# Patient Record
Sex: FEMALE | Race: WHITE | NOT HISPANIC OR LATINO | Employment: OTHER | URBAN - METROPOLITAN AREA
[De-identification: names, ages, dates, MRNs, and addresses within clinical notes are randomized per-mention and may not be internally consistent; named-entity substitution may affect disease eponyms.]

---

## 2023-10-24 ENCOUNTER — TELEPHONE (OUTPATIENT)
Dept: PRIMARY CARE | Facility: CLINIC | Age: 53
End: 2023-10-24
Payer: MEDICARE

## 2023-10-24 NOTE — TELEPHONE ENCOUNTER
PT L/M STATING SHE IS WAITING ON A RETURN CALL FROM [facility] ONLY. IF PT DOES NOT ANSWER CELL PHONE PLEASE CALL HOUSE.

## 2023-11-09 ENCOUNTER — APPOINTMENT (OUTPATIENT)
Dept: PRIMARY CARE | Facility: CLINIC | Age: 53
End: 2023-11-09
Payer: MEDICARE

## 2023-11-15 ENCOUNTER — APPOINTMENT (OUTPATIENT)
Dept: PRIMARY CARE | Facility: CLINIC | Age: 53
End: 2023-11-15
Payer: MEDICARE

## 2023-11-21 ENCOUNTER — APPOINTMENT (OUTPATIENT)
Dept: PRIMARY CARE | Facility: CLINIC | Age: 53
End: 2023-11-21
Payer: MEDICARE

## 2023-11-21 PROBLEM — H93.13 BILATERAL TINNITUS: Status: ACTIVE | Noted: 2023-11-21

## 2023-11-21 PROBLEM — M54.9 DORSALGIA, UNSPECIFIED: Status: ACTIVE | Noted: 2023-11-21

## 2023-11-21 PROBLEM — G40.311: Status: ACTIVE | Noted: 2023-11-21

## 2023-11-21 PROBLEM — N95.1 MENOPAUSAL SYMPTOM: Status: ACTIVE | Noted: 2023-11-21

## 2023-11-21 PROBLEM — G47.21 DELAYED SLEEP PHASE SYNDROME: Status: ACTIVE | Noted: 2023-11-21

## 2023-11-21 PROBLEM — I51.7 CARDIOMEGALY: Status: ACTIVE | Noted: 2023-11-21

## 2023-11-21 PROBLEM — M19.90 OSTEOARTHRITIS: Status: ACTIVE | Noted: 2023-11-21

## 2023-11-21 PROBLEM — R92.8 ABNORMAL FINDINGS ON DIAGNOSTIC IMAGING OF BREAST: Status: ACTIVE | Noted: 2023-11-21

## 2023-11-21 PROBLEM — M51.369 DDD (DEGENERATIVE DISC DISEASE), LUMBAR: Status: ACTIVE | Noted: 2023-11-21

## 2023-11-21 PROBLEM — G40.919 INTRACTABLE EPILEPSY (MULTI): Status: ACTIVE | Noted: 2023-11-21

## 2023-11-21 PROBLEM — E66.01 MORBID OBESITY (MULTI): Status: ACTIVE | Noted: 2023-11-21

## 2023-11-21 PROBLEM — E16.2 HYPOGLYCEMIA: Status: ACTIVE | Noted: 2023-11-21

## 2023-11-21 PROBLEM — M51.36 DDD (DEGENERATIVE DISC DISEASE), LUMBAR: Status: ACTIVE | Noted: 2023-11-21

## 2023-11-21 PROBLEM — E55.9 VITAMIN D DEFICIENCY: Status: ACTIVE | Noted: 2023-11-21

## 2023-11-21 PROBLEM — F51.01 PRIMARY INSOMNIA: Status: ACTIVE | Noted: 2023-11-21

## 2023-11-21 PROBLEM — E78.5 HYPERLIPIDEMIA: Status: ACTIVE | Noted: 2023-11-21

## 2023-11-21 PROBLEM — H90.3 SENSORINEURAL HEARING LOSS (SNHL) OF BOTH EARS: Status: ACTIVE | Noted: 2023-11-21

## 2023-11-21 PROBLEM — Z78.9 MEDICAL HOME PATIENT: Status: ACTIVE | Noted: 2023-11-21

## 2023-11-21 PROBLEM — K14.0 TONGUE ULCER: Status: ACTIVE | Noted: 2023-11-21

## 2023-11-21 PROBLEM — L21.9 SEBORRHEIC DERMATITIS: Status: ACTIVE | Noted: 2023-11-21

## 2023-11-21 PROBLEM — F32.9 MAJOR DEPRESSION, SINGLE EPISODE: Status: ACTIVE | Noted: 2023-11-21

## 2023-11-21 PROBLEM — M62.81 GENERALIZED MUSCLE WEAKNESS: Status: ACTIVE | Noted: 2023-11-21

## 2023-11-21 PROBLEM — Z98.84 BARIATRIC SURGERY STATUS: Status: ACTIVE | Noted: 2023-11-21

## 2023-11-21 PROBLEM — F43.9 STRESS: Status: ACTIVE | Noted: 2023-11-21

## 2023-11-21 PROBLEM — J34.89 NASAL VESTIBULITIS: Status: ACTIVE | Noted: 2023-11-21

## 2023-11-21 PROBLEM — G40.909 SEIZURE DISORDER (MULTI): Status: ACTIVE | Noted: 2023-11-21

## 2023-11-21 PROBLEM — G57.10 MERALGIA PARESTHETICA: Status: ACTIVE | Noted: 2023-11-21

## 2023-11-21 PROBLEM — M17.12 ARTHRITIS OF KNEE, LEFT: Status: ACTIVE | Noted: 2023-11-21

## 2023-11-21 RX ORDER — DULOXETIN HYDROCHLORIDE 30 MG/1
30 CAPSULE, DELAYED RELEASE ORAL DAILY
COMMUNITY
Start: 2020-12-22

## 2023-11-21 RX ORDER — MIRTAZAPINE 15 MG/1
15 TABLET, FILM COATED ORAL NIGHTLY
COMMUNITY

## 2023-11-21 RX ORDER — DOXYCYCLINE 100 MG/1
100 CAPSULE ORAL DAILY
COMMUNITY
Start: 2023-09-07 | End: 2024-01-15 | Stop reason: WASHOUT

## 2023-11-21 RX ORDER — DOXYCYCLINE 100 MG/1
100 TABLET ORAL 2 TIMES DAILY
COMMUNITY
Start: 2023-04-26 | End: 2024-01-15 | Stop reason: WASHOUT

## 2023-11-21 RX ORDER — AMLODIPINE BESYLATE 5 MG/1
5 TABLET ORAL DAILY
COMMUNITY

## 2023-11-21 RX ORDER — ALBUTEROL SULFATE 0.83 MG/ML
2.5 SOLUTION RESPIRATORY (INHALATION) EVERY 6 HOURS
COMMUNITY
Start: 2023-03-29

## 2023-11-21 RX ORDER — PROPRANOLOL HYDROCHLORIDE 20 MG/1
20 TABLET ORAL 4 TIMES DAILY
COMMUNITY

## 2023-11-21 RX ORDER — VENLAFAXINE HYDROCHLORIDE 150 MG/1
150 CAPSULE, EXTENDED RELEASE ORAL DAILY
COMMUNITY

## 2023-11-21 RX ORDER — OMEPRAZOLE 20 MG/1
20 TABLET, DELAYED RELEASE ORAL 2 TIMES DAILY
COMMUNITY

## 2023-11-21 RX ORDER — MUPIROCIN 20 MG/G
OINTMENT TOPICAL 2 TIMES DAILY
COMMUNITY

## 2023-11-21 RX ORDER — FLUOCINOLONE ACETONIDE 0.11 MG/ML
4 OIL AURICULAR (OTIC) 2 TIMES DAILY
COMMUNITY
Start: 2021-09-02 | End: 2024-01-15 | Stop reason: WASHOUT

## 2023-11-21 RX ORDER — PANTOPRAZOLE SODIUM 20 MG/1
20 TABLET, DELAYED RELEASE ORAL DAILY
COMMUNITY
Start: 2023-11-10

## 2023-11-21 RX ORDER — LAMOTRIGINE 200 MG/1
200 TABLET ORAL 2 TIMES DAILY
COMMUNITY

## 2023-11-21 RX ORDER — ESZOPICLONE 2 MG/1
2 TABLET, FILM COATED ORAL NIGHTLY
COMMUNITY

## 2023-11-21 RX ORDER — MECLIZINE HYDROCHLORIDE 25 MG/1
25 TABLET ORAL 3 TIMES DAILY PRN
COMMUNITY
Start: 2019-02-21

## 2023-11-21 RX ORDER — VALACYCLOVIR HYDROCHLORIDE 1 G/1
1 TABLET, FILM COATED ORAL 3 TIMES DAILY
COMMUNITY
Start: 2023-03-29

## 2023-11-21 RX ORDER — TOPIRAMATE 100 MG/1
2 CAPSULE, EXTENDED RELEASE ORAL 2 TIMES DAILY
COMMUNITY

## 2023-11-21 RX ORDER — ALBUTEROL SULFATE 90 UG/1
2 AEROSOL, METERED RESPIRATORY (INHALATION) EVERY 4 HOURS
COMMUNITY
Start: 2023-03-23

## 2023-11-21 RX ORDER — SERTRALINE HYDROCHLORIDE 50 MG/1
50 TABLET, FILM COATED ORAL DAILY
COMMUNITY

## 2023-11-21 RX ORDER — ONDANSETRON 4 MG/1
4 TABLET, FILM COATED ORAL EVERY 8 HOURS
COMMUNITY
Start: 2022-09-13

## 2023-11-21 RX ORDER — OMEPRAZOLE 20 MG/1
20 CAPSULE, DELAYED RELEASE ORAL
COMMUNITY
Start: 2020-06-15

## 2023-11-21 RX ORDER — HYDROXYZINE HYDROCHLORIDE 50 MG/1
50 TABLET, FILM COATED ORAL 3 TIMES DAILY
COMMUNITY

## 2023-11-21 RX ORDER — MAG HYDROX/ALUMINUM HYD/SIMETH 200-200-20
SUSPENSION, ORAL (FINAL DOSE FORM) ORAL 2 TIMES DAILY
COMMUNITY
Start: 2020-06-30

## 2023-11-21 RX ORDER — DICYCLOMINE HYDROCHLORIDE 10 MG/1
10 CAPSULE ORAL 2 TIMES DAILY
COMMUNITY
Start: 2023-10-13 | End: 2024-01-15 | Stop reason: WASHOUT

## 2023-11-21 RX ORDER — METRONIDAZOLE 7.5 MG/G
1 LOTION TOPICAL 2 TIMES DAILY
COMMUNITY
Start: 2020-09-17

## 2023-11-21 RX ORDER — CYCLOBENZAPRINE HCL 10 MG
10 TABLET ORAL
COMMUNITY
End: 2024-01-15 | Stop reason: WASHOUT

## 2023-11-21 RX ORDER — FLUTICASONE PROPIONATE 50 MCG
1 SPRAY, SUSPENSION (ML) NASAL EVERY 24 HOURS
COMMUNITY
Start: 2023-03-20

## 2023-11-21 RX ORDER — ERGOCALCIFEROL 1.25 MG/1
1 CAPSULE ORAL
COMMUNITY
End: 2024-06-05 | Stop reason: SDUPTHER

## 2023-11-21 RX ORDER — SILVER SULFADIAZINE 10 G/1000G
1 CREAM TOPICAL EVERY 24 HOURS
COMMUNITY
Start: 2022-04-11

## 2023-12-07 ENCOUNTER — APPOINTMENT (OUTPATIENT)
Dept: PRIMARY CARE | Facility: CLINIC | Age: 53
End: 2023-12-07
Payer: MEDICARE

## 2023-12-29 DIAGNOSIS — L29.9 EAR ITCHING: ICD-10-CM

## 2023-12-29 NOTE — TELEPHONE ENCOUNTER
Last seen 9/2022, has used fluocinolone drops for ear itching in the past. She is experiencing itching deep in the ear canal and requested a refill until her appt 2/8/2024.

## 2023-12-29 NOTE — TELEPHONE ENCOUNTER
Also having recurrent nosebleed right nares, I discussed moisture and gentle blowing. She will call if these become more frequent.

## 2024-01-01 RX ORDER — FLUOCINOLONE ACETONIDE 0.11 MG/ML
OIL AURICULAR (OTIC)
Qty: 1 ML | Refills: 1 | Status: SHIPPED | OUTPATIENT
Start: 2024-01-01

## 2024-01-15 ENCOUNTER — OFFICE VISIT (OUTPATIENT)
Dept: PRIMARY CARE | Facility: CLINIC | Age: 54
End: 2024-01-15
Payer: MEDICARE

## 2024-01-15 VITALS
OXYGEN SATURATION: 98 % | TEMPERATURE: 97.9 F | DIASTOLIC BLOOD PRESSURE: 80 MMHG | HEIGHT: 61 IN | WEIGHT: 276.8 LBS | BODY MASS INDEX: 52.26 KG/M2 | SYSTOLIC BLOOD PRESSURE: 118 MMHG | RESPIRATION RATE: 18 BRPM | HEART RATE: 81 BPM

## 2024-01-15 DIAGNOSIS — Z13.220 SCREENING FOR LIPID DISORDERS: ICD-10-CM

## 2024-01-15 DIAGNOSIS — E66.9 OBESITY WITH SERIOUS COMORBIDITY, UNSPECIFIED CLASSIFICATION, UNSPECIFIED OBESITY TYPE: ICD-10-CM

## 2024-01-15 DIAGNOSIS — R73.9 ELEVATED BLOOD SUGAR LEVEL: ICD-10-CM

## 2024-01-15 DIAGNOSIS — E55.9 VITAMIN D DEFICIENCY: ICD-10-CM

## 2024-01-15 DIAGNOSIS — Z23 NEED FOR VACCINATION: ICD-10-CM

## 2024-01-15 DIAGNOSIS — Z23 ENCOUNTER FOR IMMUNIZATION: Primary | ICD-10-CM

## 2024-01-15 DIAGNOSIS — R53.83 OTHER FATIGUE: ICD-10-CM

## 2024-01-15 LAB
25(OH)D3 SERPL-MCNC: 49 NG/ML (ref 31–100)
ALBUMIN SERPL-MCNC: 4.1 G/DL (ref 3.5–5)
ALP BLD-CCNC: 166 U/L (ref 35–125)
ALT SERPL-CCNC: 11 U/L (ref 5–40)
ANION GAP SERPL CALC-SCNC: 12 MMOL/L
AST SERPL-CCNC: 11 U/L (ref 5–40)
BILIRUB SERPL-MCNC: 0.2 MG/DL (ref 0.1–1.2)
BUN SERPL-MCNC: 22 MG/DL (ref 8–25)
CALCIUM SERPL-MCNC: 9 MG/DL (ref 8.5–10.4)
CHLORIDE SERPL-SCNC: 105 MMOL/L (ref 97–107)
CHOLEST SERPL-MCNC: 190 MG/DL (ref 133–200)
CHOLEST/HDLC SERPL: 4.2 {RATIO}
CO2 SERPL-SCNC: 22 MMOL/L (ref 24–31)
CREAT SERPL-MCNC: 0.9 MG/DL (ref 0.4–1.6)
EGFRCR SERPLBLD CKD-EPI 2021: 76 ML/MIN/1.73M*2
EST. AVERAGE GLUCOSE BLD GHB EST-MCNC: 105 MG/DL
GLUCOSE SERPL-MCNC: 102 MG/DL (ref 65–99)
HBA1C MFR BLD: 5.3 %
HDLC SERPL-MCNC: 45 MG/DL
LDLC SERPL CALC-MCNC: 113 MG/DL (ref 65–130)
POTASSIUM SERPL-SCNC: 4.2 MMOL/L (ref 3.4–5.1)
PROT SERPL-MCNC: 6.3 G/DL (ref 5.9–7.9)
SODIUM SERPL-SCNC: 139 MMOL/L (ref 133–145)
TRIGL SERPL-MCNC: 160 MG/DL (ref 40–150)
TSH SERPL DL<=0.05 MIU/L-ACNC: 2.77 MIU/L (ref 0.27–4.2)

## 2024-01-15 PROCEDURE — 99214 OFFICE O/P EST MOD 30 MIN: CPT | Performed by: NURSE PRACTITIONER

## 2024-01-15 PROCEDURE — 1036F TOBACCO NON-USER: CPT | Performed by: NURSE PRACTITIONER

## 2024-01-15 PROCEDURE — 83036 HEMOGLOBIN GLYCOSYLATED A1C: CPT | Performed by: NURSE PRACTITIONER

## 2024-01-15 PROCEDURE — 80053 COMPREHEN METABOLIC PANEL: CPT | Performed by: NURSE PRACTITIONER

## 2024-01-15 PROCEDURE — 36415 COLL VENOUS BLD VENIPUNCTURE: CPT | Performed by: NURSE PRACTITIONER

## 2024-01-15 PROCEDURE — 83718 ASSAY OF LIPOPROTEIN: CPT | Performed by: NURSE PRACTITIONER

## 2024-01-15 PROCEDURE — 82306 VITAMIN D 25 HYDROXY: CPT | Performed by: NURSE PRACTITIONER

## 2024-01-15 PROCEDURE — 90686 IIV4 VACC NO PRSV 0.5 ML IM: CPT | Performed by: NURSE PRACTITIONER

## 2024-01-15 PROCEDURE — 84443 ASSAY THYROID STIM HORMONE: CPT | Performed by: NURSE PRACTITIONER

## 2024-01-15 ASSESSMENT — PATIENT HEALTH QUESTIONNAIRE - PHQ9
2. FEELING DOWN, DEPRESSED OR HOPELESS: NOT AT ALL
SUM OF ALL RESPONSES TO PHQ9 QUESTIONS 1 AND 2: 0
1. LITTLE INTEREST OR PLEASURE IN DOING THINGS: NOT AT ALL

## 2024-01-15 ASSESSMENT — ENCOUNTER SYMPTOMS: UNEXPECTED WEIGHT CHANGE: 1

## 2024-01-15 ASSESSMENT — PAIN SCALES - GENERAL: PAINLEVEL: 0-NO PAIN

## 2024-01-15 NOTE — PROGRESS NOTES
"Subjective   Patient ID: Odilia Oneal is a 53 y.o. female who presents for Immunizations (PT IS HERE FOR A FLU SHOT) and Follow-up (PT STATES SHE NEEDS REPEAT LABS, HAD PHYSICAL 05/2023 AND STATES SOME LABS WERE NOT DONE. ).    Isha her dog has recurrent cancer.[place] [facility] [place] from [facility].has incresed appipite , eating at night .wants to eat all the time       Review of Systems   Constitutional:  Positive for unexpected weight change.   HENT:  Positive for congestion.        Objective   /80   Pulse 81   Temp 36.6 °C (97.9 °F)   Resp 18   Ht 1.549 m (5' 1\")   Wt 126 kg (276 lb 12.8 oz)   SpO2 98%   BMI 52.30 kg/m²     Physical Exam  Constitutional:       General: She is not in acute distress.     Appearance: Normal appearance.   Cardiovascular:      Rate and Rhythm: Normal rate and regular rhythm.      Heart sounds: No murmur heard.  Pulmonary:      Breath sounds: Normal breath sounds. No wheezing.   Neurological:      Mental Status: She is alert.         Assessment/Plan   Problem List Items Addressed This Visit             ICD-10-CM    Vitamin D deficiency E55.9    Relevant Orders    Vitamin D 25-Hydroxy,Total (for eval of Vitamin D levels) (Completed)     Other Visit Diagnoses         Codes    Encounter for immunization    -  Primary Z23    Other fatigue     R53.83    Relevant Orders    TSH with reflex to Free T4 if abnormal (Completed)    Comprehensive Metabolic Panel (Completed)    Elevated blood sugar level     R73.9    Relevant Orders    Hemoglobin A1c (Completed)    Comprehensive Metabolic Panel (Completed)    Obesity with serious comorbidity, unspecified classification, unspecified obesity type     E66.9    Relevant Orders    Comprehensive Metabolic Panel (Completed)    Lipid Panel (Completed)    Screening for lipid disorders     Z13.220    Need for vaccination     Z23    Relevant Orders    Flu vaccine (IIV4) age 6 months and greater, " preservative free (Completed)

## 2024-01-15 NOTE — PATIENT INSTRUCTIONS
Dicussed needs current med's from [facility]. Pt unsure of what  she is taking. Sees issa has a lot of headache,willdiscuss them with him

## 2024-01-16 DIAGNOSIS — R74.8 ELEVATED ALKALINE PHOSPHATASE LEVEL: ICD-10-CM

## 2024-01-31 ENCOUNTER — CLINICAL SUPPORT (OUTPATIENT)
Dept: PRIMARY CARE | Facility: CLINIC | Age: 54
End: 2024-01-31
Payer: MEDICARE

## 2024-01-31 DIAGNOSIS — R74.8 ELEVATED ALKALINE PHOSPHATASE LEVEL: ICD-10-CM

## 2024-01-31 LAB — PTH-INTACT SERPL-MCNC: 54.7 PG/ML (ref 18.5–88)

## 2024-01-31 PROCEDURE — 36415 COLL VENOUS BLD VENIPUNCTURE: CPT

## 2024-01-31 PROCEDURE — 83970 ASSAY OF PARATHORMONE: CPT | Mod: WESLAB

## 2024-01-31 PROCEDURE — 82330 ASSAY OF CALCIUM: CPT | Mod: WESLAB

## 2024-02-01 LAB — CA-I BLD-SCNC: 1.16 MMOL/L (ref 1.1–1.33)

## 2024-02-08 ENCOUNTER — APPOINTMENT (OUTPATIENT)
Dept: OTOLARYNGOLOGY | Facility: CLINIC | Age: 54
End: 2024-02-08
Payer: MEDICARE

## 2024-02-20 ENCOUNTER — APPOINTMENT (OUTPATIENT)
Dept: ORTHOPEDIC SURGERY | Facility: CLINIC | Age: 54
End: 2024-02-20
Payer: MEDICARE

## 2024-02-20 ENCOUNTER — TELEPHONE (OUTPATIENT)
Dept: PRIMARY CARE | Facility: CLINIC | Age: 54
End: 2024-02-20

## 2024-02-20 DIAGNOSIS — J01.90 ACUTE NON-RECURRENT SINUSITIS, UNSPECIFIED LOCATION: Primary | ICD-10-CM

## 2024-02-20 NOTE — TELEPHONE ENCOUNTER
PT C/O COUGH AND SINUS PAIN X 4 DAYS. PT STATES SHE FEELS MISERABLE AND WOULD LIKE SOMETHING TO HELP WITH THIS

## 2024-02-22 RX ORDER — FLUTICASONE PROPIONATE 50 MCG
1 SPRAY, SUSPENSION (ML) NASAL DAILY
Qty: 16 G | Refills: 5 | Status: SHIPPED | OUTPATIENT
Start: 2024-02-22 | End: 2025-02-21

## 2024-02-22 RX ORDER — AZITHROMYCIN 250 MG/1
TABLET, FILM COATED ORAL
Qty: 6 TABLET | Refills: 0 | Status: SHIPPED | OUTPATIENT
Start: 2024-02-22 | End: 2024-02-27

## 2024-02-23 ENCOUNTER — TELEPHONE (OUTPATIENT)
Dept: NEUROLOGY | Facility: CLINIC | Age: 54
End: 2024-02-23
Payer: MEDICARE

## 2024-02-23 DIAGNOSIS — G40.909 SEIZURE DISORDER (MULTI): Primary | ICD-10-CM

## 2024-03-11 ENCOUNTER — TELEPHONE (OUTPATIENT)
Dept: PRIMARY CARE | Facility: CLINIC | Age: 54
End: 2024-03-11
Payer: MEDICARE

## 2024-03-13 ENCOUNTER — TELEPHONE (OUTPATIENT)
Dept: PRIMARY CARE | Facility: CLINIC | Age: 54
End: 2024-03-13
Payer: MEDICARE

## 2024-03-13 NOTE — TELEPHONE ENCOUNTER
Pt tested positive for covid 3/8. Pt states she has irritated/puffy/painful right eye. Please advise. .

## 2024-03-26 ENCOUNTER — OFFICE VISIT (OUTPATIENT)
Dept: OTOLARYNGOLOGY | Facility: CLINIC | Age: 54
End: 2024-03-26
Payer: MEDICARE

## 2024-03-26 VITALS — BODY MASS INDEX: 51.35 KG/M2 | HEIGHT: 61 IN | WEIGHT: 272 LBS

## 2024-03-26 DIAGNOSIS — H90.3 BILATERAL SENSORINEURAL HEARING LOSS: Primary | ICD-10-CM

## 2024-03-26 DIAGNOSIS — J31.0 CHRONIC RHINITIS: ICD-10-CM

## 2024-03-26 PROCEDURE — 1036F TOBACCO NON-USER: CPT | Performed by: OTOLARYNGOLOGY

## 2024-03-26 PROCEDURE — 99214 OFFICE O/P EST MOD 30 MIN: CPT | Performed by: OTOLARYNGOLOGY

## 2024-03-26 NOTE — PROGRESS NOTES
Chief Complaint   Patient presents with    Ear Tube Check     EP 9/29/22- POSSIBLE DOG HAIR IN EAR, THROAT ISSUES     HPI:  Odilia Oneal is a 53 y.o. female who has a history of bilateral sensorineural hearing loss and wears hearing aids complains of 4-month history of some increased hearing loss and ear pressure.  Hearing aids not working as well.  Also getting some sinus headaches and pressure.  Occasional sore throat.    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    Personal history of other diseases of the nervous system and sense organs     History of obstructive sleep apnea    Unspecified convulsions (CMS/HCC)     Convulsions     Past Surgical History:   Procedure Laterality Date    MR HEAD ANGIO WO IV CONTRAST  6/23/2020    MR HEAD ANGIO WO IV CONTRAST 6/23/2020 [facility]    MR NECK ANGIO WO IV CONTRAST  6/23/2020    MR NECK ANGIO WO IV CONTRAST 6/23/2020 [facility]    OTHER SURGICAL HISTORY  02/18/2021    Tonsillectomy    OTHER SURGICAL HISTORY  02/18/2021    Shoulder surgery    OTHER SURGICAL HISTORY  02/18/2021    Back surgery    OTHER SURGICAL HISTORY  09/29/2022    Knee surgery         Medications:     Current Outpatient Medications:     albuterol 2.5 mg /3 mL (0.083 %) nebulizer solution, Take 3 mL (2.5 mg) by nebulization every 6 hours., Disp: , Rfl:     albuterol 90 mcg/actuation inhaler, Inhale 2 puffs every 4 hours., Disp: , Rfl:     amLODIPine (Norvasc) 5 mg tablet, Take 1 tablet (5 mg) by mouth once daily., Disp: , Rfl:     DULoxetine (Cymbalta) 30 mg DR capsule, Take 1 capsule (30 mg) by mouth once daily., Disp: , Rfl:     ergocalciferol (Vitamin D-2) 1.25 MG (06653 UT) capsule, Take 1 capsule (1,250 mcg) by mouth 1 (one) time per week., Disp: , Rfl:     eszopiclone (Lunesta) 2 mg tablet, Take 1 tablet (2 mg) by mouth once daily at bedtime., Disp: , Rfl:     fluocinolone  (DermOtic) 0.01 % ear drops, 4 drops to the affected ear/s twice daily as needed for itching. 1 bottle. 11 refills., Disp: 1 mL, Rfl: 1    fluticasone (Flonase) 50 mcg/actuation nasal spray, Administer 1 spray into each nostril once every 24 hours., Disp: , Rfl:     fluticasone (Flonase) 50 mcg/actuation nasal spray, Administer 1 spray into each nostril once daily. Shake gently. Before first use, prime pump. After use, clean tip and replace cap., Disp: 16 g, Rfl: 5    hydrOXYzine HCL (Atarax) 50 mg tablet, Take 1 tablet (50 mg) by mouth 3 times a day., Disp: , Rfl:     lamoTRIgine (LaMICtal) 200 mg tablet, Take 1 tablet (200 mg) by mouth 2 times a day., Disp: , Rfl:     metroNIDAZOLE (Metrolotion) 0.75 % lotion lotion, Apply 1 Application topically 2 times a day., Disp: , Rfl:     mirtazapine (Remeron) 15 mg tablet, Take 1 tablet (15 mg) by mouth once daily at bedtime., Disp: , Rfl:     mupirocin (Bactroban) 2 % ointment, Apply topically 2 times a day., Disp: , Rfl:     omeprazole (PriLOSEC) 20 mg DR capsule, Take 1 capsule (20 mg) by mouth once daily in the morning. Take before meals., Disp: , Rfl:     omeprazole OTC (PriLOSEC OTC) 20 mg EC tablet, Take 1 tablet (20 mg) by mouth twice a day., Disp: , Rfl:     ondansetron (Zofran) 4 mg tablet, Take 1 tablet (4 mg) by mouth every 8 hours., Disp: , Rfl:     pantoprazole (ProtoNix) 20 mg EC tablet, Take 1 tablet (20 mg) by mouth once daily., Disp: , Rfl:     propranolol (Inderal) 20 mg tablet, Take 1 tablet (20 mg) by mouth 4 times a day., Disp: , Rfl:     sertraline (Zoloft) 50 mg tablet, Take 1 tablet (50 mg) by mouth once daily., Disp: , Rfl:     silver sulfADIAZINE (Silvadene) 1 % cream, Apply 1 Application topically once every 24 hours., Disp: , Rfl:     simvastatin (Zocor) 20 mg tablet, TAKE 1 TABLET BY MOUTH ONCE DAILY IN THE EVENING, Disp: 90 tablet, Rfl: 1    Trokendi  mg capsule,extended release 24hr, Take 2 capsules by mouth 2 times a day., Disp: ,  "Rfl:     valACYclovir (Valtrex) 1 gram tablet, Take 1 tablet (1,000 mg) by mouth 3 times a day., Disp: , Rfl:     venlafaxine XR (Effexor-XR) 150 mg 24 hr capsule, Take 1 capsule (150 mg) by mouth once daily., Disp: , Rfl:     hydrocortisone 1 % ointment, Apply topically twice a day., Disp: , Rfl:     meclizine (Antivert) 25 mg tablet, Take 1 tablet (25 mg) by mouth 3 times a day as needed., Disp: , Rfl:      Allergies:  Allergies   Allergen Reactions    Aspirin Nausea/vomiting    Chlorhexidine Gluconate Other    Codeine Other and Nausea/vomiting    Doxycycline Unknown    Ibuprofen Other and Nausea/vomiting    Penicillins Nausea/vomiting    Adhesive Tape-Silicones Rash and Other    Latex Other and Rash        ROS:  Review of systems normal unless stated otherwise in the HPI and/or PMH.    Physical Exam:  Height 1.549 m (5' 1\"), weight 123 kg (272 lb). Body mass index is 51.39 kg/m².     GENERAL APPEARANCE: Well developed and well nourished.  Alert and oriented in no acute distress.  Normal vocal quality.      HEAD/FACE: No erythema or edema or facial tenderness.  Normal facial nerve function bilaterally.    EAR:       EXTERNAL: Normal pinnas and external auditory canals without lesion or obstructing wax.  Mild wax removed.       MIDDLE EAR: Tympanic membranes intact and mobile with normal landmarks.  Middle ear space appears well aerated.       TUBE STATUS: N/A       MASTOID CAVITY: N/A       HEARING: Gross hearing assessment is within normal limits.      NOSE:       VISUALIZED USING: Anterior rhinoscopy with headlight and nasal speculum.       DORSUM: Midline, nontraumatic appearance.       MUCOSA: Normal-appearing.       SECRETIONS: Normal.       SEPTUM: Midline and nonobstructing.       INFERIOR TURBINATES: Normal.       MIDDLE TURBINATES/MEATUS: N/A       BLEEDING: N/A         ORAL CAVITY/PHARYNX:       TEETH: Adequate dentition.       TONGUE: No mass or lesion.  Normal mobility.       FLOOR OF MOUTH: No mass or " lesion.       PALATE: Normal hard palate, soft palate, and uvula.       OROPHARYNX: Normal without mass or lesion.  Tonsils absent       BUCCAL MUCOSA/GBS: Normal without mass or lesion.       LIPS: Normal.    LARYNX/HYPOPHARYNX/NASOPHARYNX: N/A    NECK: No palpable masses or abnormal adenopathy.  Trachea is midline.    THYROID: No thyromegaly or palpable nodule.    SALIVARY GLANDS: Normal bilateral parotid and submandibular glands by inspection and palpation.    TMJ's: Normal.    NEURO: Cranial nerve exam grossly normal bilaterally.       Assessment/Plan   Odilia was seen today for ear tube check.  Diagnoses and all orders for this visit:  Bilateral sensorineural hearing loss (Primary)  Chronic rhinitis     Reassured.  Head neck exam looks okay.  Recommend she have a repeat hearing test and discussed with her audiologist potentially adjusting her hearing aids.  Use Flonase and saline spray for some headaches with weather changes.  Follow-up with PCP as well.  Follow up for audiogram.     Kobi Monge MD

## 2024-04-08 DIAGNOSIS — M17.12 ARTHRITIS OF KNEE, LEFT: Primary | ICD-10-CM

## 2024-04-08 DIAGNOSIS — M51.36 DDD (DEGENERATIVE DISC DISEASE), LUMBAR: ICD-10-CM

## 2024-04-10 NOTE — TELEPHONE ENCOUNTER
Pt states she has a hard time getting around with arthritis in back and can't walk very far.   Pt c/o ongoing cough post covid. Worse at night per pt. Any recommendations?

## 2024-04-22 RX ORDER — CALCIUM CARBONATE 160(400)MG
TABLET,CHEWABLE ORAL
Qty: 1 EACH | Refills: 0 | Status: SHIPPED | OUTPATIENT
Start: 2024-04-22

## 2024-05-06 ENCOUNTER — TELEPHONE (OUTPATIENT)
Dept: PRIMARY CARE | Facility: CLINIC | Age: 54
End: 2024-05-06
Payer: MEDICARE

## 2024-05-06 DIAGNOSIS — M17.12 ARTHRITIS OF KNEE, LEFT: ICD-10-CM

## 2024-05-06 DIAGNOSIS — M51.36 DDD (DEGENERATIVE DISC DISEASE), LUMBAR: ICD-10-CM

## 2024-05-13 NOTE — TELEPHONE ENCOUNTER
Can you assist with this or recommend a company to submit paperwork to? I am not sure how to order a motorized scooter.

## 2024-06-05 DIAGNOSIS — E55.9 VITAMIN D DEFICIENCY: ICD-10-CM

## 2024-06-05 RX ORDER — ERGOCALCIFEROL 1.25 MG/1
1 CAPSULE ORAL
Qty: 12 CAPSULE | Refills: 0 | Status: SHIPPED | OUTPATIENT
Start: 2024-06-09

## 2024-06-16 DIAGNOSIS — E78.5 HYPERLIPIDEMIA, UNSPECIFIED HYPERLIPIDEMIA TYPE: Primary | ICD-10-CM

## 2024-06-17 RX ORDER — SIMVASTATIN 20 MG/1
20 TABLET, FILM COATED ORAL EVERY EVENING
Qty: 90 TABLET | Refills: 0 | Status: SHIPPED | OUTPATIENT
Start: 2024-06-17

## 2024-06-24 ENCOUNTER — TELEPHONE (OUTPATIENT)
Dept: NEUROLOGY | Facility: CLINIC | Age: 54
End: 2024-06-24
Payer: MEDICARE

## 2024-07-09 ENCOUNTER — OFFICE VISIT (OUTPATIENT)
Dept: PRIMARY CARE | Facility: CLINIC | Age: 54
End: 2024-07-09
Payer: MEDICARE

## 2024-07-09 VITALS — BODY MASS INDEX: 52.3 KG/M2 | HEIGHT: 61 IN | RESPIRATION RATE: 18 BRPM | WEIGHT: 277 LBS
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
## 2024-07-09 DIAGNOSIS — N62 LARGE BREASTS: ICD-10-CM

## 2024-07-09 DIAGNOSIS — L42 PITYRIASIS ROSEA: ICD-10-CM

## 2024-07-09 PROCEDURE — 1036F TOBACCO NON-USER: CPT | Performed by: NURSE PRACTITIONER

## 2024-07-09 PROCEDURE — 99213 OFFICE O/P EST LOW 20 MIN: CPT | Performed by: NURSE PRACTITIONER

## 2024-07-09 RX ORDER — TRIAMCINOLONE ACETONIDE 1 MG/G
CREAM TOPICAL
COMMUNITY
Start: 2024-04-16

## 2024-07-09 RX ORDER — TACROLIMUS 1 MG/G
1 OINTMENT TOPICAL
COMMUNITY
Start: 2024-05-05

## 2024-07-09 RX ORDER — METHYLPREDNISOLONE 4 MG/1
TABLET ORAL
Qty: 21 TABLET | Refills: 0 | Status: SHIPPED | OUTPATIENT
Start: 2024-07-09 | End: 2024-07-16

## 2024-07-09 RX ORDER — DICYCLOMINE HYDROCHLORIDE 10 MG/1
10 CAPSULE ORAL 4 TIMES DAILY
COMMUNITY
Start: 2024-04-24

## 2024-07-09 RX ORDER — TRIAMCINOLONE ACETONIDE 1 MG/G
CREAM TOPICAL 2 TIMES DAILY
Qty: 30 G | Refills: 0 | Status: SHIPPED | OUTPATIENT
Start: 2024-07-09

## 2024-07-09 RX ORDER — CETIRIZINE HYDROCHLORIDE 10 MG/1
10 TABLET ORAL DAILY
Qty: 30 TABLET | Refills: 2 | Status: SHIPPED | OUTPATIENT
Start: 2024-07-09 | End: 2024-10-07

## 2024-07-09 ASSESSMENT — LIFESTYLE VARIABLES
SKIP TO QUESTIONS 9-10: 1
HOW OFTEN DO YOU HAVE A DRINK CONTAINING ALCOHOL: NEVER
HOW MANY STANDARD DRINKS CONTAINING ALCOHOL DO YOU HAVE ON A TYPICAL DAY: PATIENT DOES NOT DRINK
HOW OFTEN DO YOU HAVE SIX OR MORE DRINKS ON ONE OCCASION: NEVER
AUDIT-C TOTAL SCORE: 0

## 2024-07-09 ASSESSMENT — PATIENT HEALTH QUESTIONNAIRE - PHQ9
1. LITTLE INTEREST OR PLEASURE IN DOING THINGS: NOT AT ALL
2. FEELING DOWN, DEPRESSED OR HOPELESS: NOT AT ALL
SUM OF ALL RESPONSES TO PHQ9 QUESTIONS 1 AND 2: 0

## 2024-07-09 ASSESSMENT — PAIN SCALES - GENERAL: PAINLEVEL: 0-NO PAIN

## 2024-07-09 NOTE — PATIENT INSTRUCTIONS
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

## 2024-07-09 NOTE — PROGRESS NOTES
"Subjective   Patient ID: Odilia Oneal is a 53 y.o. female who presents for Rash (PT C/O RASH ON LEFT BREAST AND BOTH FLANKS. PT ALSO WITH BUG BITES ON HER LEGS THAT ARE VERY ITCHY).    RASH STARTEDONTRUNK FOR 2DAYS HAS NILAY SPREADINH    Rash  This is a new problem. The current episode started in the past 7 days. The problem has been waxing and waning since onset. The affected locations include the abdomen, left lower leg, torso and right lower leg. The rash is characterized by redness and itchiness. Past treatments include analgesics and moisturizer. The treatment provided no relief.        Review of Systems   Skin:  Positive for rash.       Objective   Resp 18   Ht 1.549 m (5' 1\")   Wt 126 kg (277 lb)   BMI 52.34 kg/m²     Physical Exam  Constitutional:       Appearance: Normal appearance. She is obese.   Genitourinary:     Comments: Pendulous breasts that cause back pain  Skin:     Findings: Rash present.      Comments: PT HAS  2 DIFFERENT RASHES RASH ON ABDOMEN HAS A HAROLDspot and  HAS SALMON OVAL scaley lesion pinkish in color .THEYTHEMTHEIRS  LESIONS ON FRONT TRUNK NO INFECTION  MORE IN JEY FORMATION. RASHONLEGS BITES THAT AREI NFLAMMED RED BLOTCHES ALL OVER OBVIOUS ITCHING   Neurological:      Mental Status: She is alert.   Psychiatric:         Behavior: Behavior normal.         Assessment/Plan   Problem List Items Addressed This Visit    None  Visit Diagnoses         Codes    Bug bite, initial encounter    -  Primary W57.XXXA               "

## 2024-07-10 ENCOUNTER — TELEPHONE (OUTPATIENT)
Dept: NEUROLOGY | Facility: CLINIC | Age: 54
End: 2024-07-10
Payer: MEDICARE

## 2024-07-10 DIAGNOSIS — G40.909 SEIZURE DISORDER (MULTI): Primary | ICD-10-CM

## 2024-07-22 ENCOUNTER — TELEPHONE (OUTPATIENT)
Dept: PRIMARY CARE | Facility: CLINIC | Age: 54
End: 2024-07-22
Payer: MEDICARE

## 2024-07-22 DIAGNOSIS — N62 LARGE BREASTS: Primary | ICD-10-CM

## 2024-08-05 DIAGNOSIS — E78.5 HYPERLIPIDEMIA, UNSPECIFIED HYPERLIPIDEMIA TYPE: ICD-10-CM

## 2024-08-05 RX ORDER — SIMVASTATIN 20 MG/1
20 TABLET, FILM COATED ORAL EVERY EVENING
Qty: 90 TABLET | Refills: 0 | Status: SHIPPED | OUTPATIENT
Start: 2024-08-05

## 2024-09-02 DIAGNOSIS — E55.9 VITAMIN D DEFICIENCY: ICD-10-CM

## 2024-09-03 RX ORDER — ERGOCALCIFEROL 1.25 MG/1
1 CAPSULE ORAL
Qty: 12 CAPSULE | Refills: 0 | Status: SHIPPED | OUTPATIENT
Start: 2024-09-08

## 2024-09-05 ENCOUNTER — TELEPHONE (OUTPATIENT)
Dept: PRIMARY CARE | Facility: CLINIC | Age: 54
End: 2024-09-05
Payer: MEDICARE

## 2024-09-05 DIAGNOSIS — E66.9 OBESITY WITH SERIOUS COMORBIDITY, UNSPECIFIED CLASSIFICATION, UNSPECIFIED OBESITY TYPE: Primary | ICD-10-CM

## 2024-09-05 NOTE — TELEPHONE ENCOUNTER
Pt called and states dr Lim would like to know if she qualifies for any of the weight loss shots.

## 2024-09-06 ENCOUNTER — OFFICE VISIT (OUTPATIENT)
Dept: NEUROLOGY | Facility: CLINIC | Age: 54
End: 2024-09-06
Payer: MEDICARE

## 2024-09-06 DIAGNOSIS — G40.909 SEIZURE DISORDER (MULTI): Primary | ICD-10-CM

## 2024-09-06 PROCEDURE — 99213 OFFICE O/P EST LOW 20 MIN: CPT | Performed by: PSYCHIATRY & NEUROLOGY

## 2024-09-06 RX ORDER — LAMOTRIGINE 200 MG/1
200 TABLET ORAL 2 TIMES DAILY
Qty: 180 TABLET | Refills: 3 | Status: SHIPPED | OUTPATIENT
Start: 2024-09-06

## 2024-09-06 RX ORDER — TOPIRAMATE 100 MG/1
2 CAPSULE, EXTENDED RELEASE ORAL 2 TIMES DAILY
Qty: 360 CAPSULE | Refills: 3 | Status: SHIPPED | OUTPATIENT
Start: 2024-09-06

## 2024-09-06 NOTE — PATIENT INSTRUCTIONS
Odilia I am retiring.  Please establish care with another  neurologist (call [phone] to schedule an appointment).  Local neurologists I recommend are Dr. Jay Branham and Dr. Michelet Chopra.  Their offices are in [place] or [place] and you can schedule an appointment with them right now at your convenience.  Also, I can recommend Dr. Patrick Villanueva and Dr. Leo Chase, who will be opening an office in [place] in the coming months - scheduling for them will be available soon by calling the same  scheduling number [phone].   And there are  neurologists in other locations too.  All of your medical records will be in the computer and available to any  physician you choose.  Thanks and best wishes to you  --Dr. Bashir

## 2024-09-06 NOTE — PROGRESS NOTES
Chief complaint:    53 year old woman with seizure disorder, also pseudoseizures.  PCP Amrita Booker CNP.    HPI:    No seizures.  No side effects.  Takes meds regularly.  No new neurological symptoms.    Feels good.  Been my patient for about 25 years or so.    Current medications include:  Lamotrigine 200 mg BID  Trokendi  mg takes 2-2    I reviewed the relevant portions of the patient's chart since the last visit with me on  9/8/23.    Neurologic Exam     Mental Status   Oriented to person, place, and time.   Level of consciousness: alert    Cranial Nerves   Cranial nerves II through XII intact.     Motor Exam   Muscle bulk: normal  Overall muscle tone: normal    Strength   Strength 5/5 except as noted.     Sensory Exam   Light touch normal.     Gait, Coordination, and Reflexes     Gait  Gait: normal    Coordination   Romberg: negative    Reflexes   Reflexes 2+ except as noted.   Right plantar: normal  Left plantar: normal     I reviewed the following data on the patient:  Lab work reviewed.    Assessment and Plan:  Seizure-free.  Doing well.  Continue current treatment.   The patient was advised I will be retiring in September of 2024.   Suggested patient establish care with another  neurologist.  There are providers that see patients in [facility], and other area locations.  Contact information to schedule was provided to the patient.    [id number]/TC>50%      Haris Bashir MD

## 2024-09-16 ENCOUNTER — OFFICE VISIT (OUTPATIENT)
Dept: PRIMARY CARE | Facility: CLINIC | Age: 54
End: 2024-09-16
Payer: MEDICARE

## 2024-09-16 ENCOUNTER — TELEPHONE (OUTPATIENT)
Dept: NEUROLOGY | Facility: CLINIC | Age: 54
End: 2024-09-16

## 2024-09-16 VITALS
TEMPERATURE: 97.9 F | HEIGHT: 61 IN | BODY MASS INDEX: 53.43 KG/M2 | HEART RATE: 74 BPM | RESPIRATION RATE: 18 BRPM | WEIGHT: 283 LBS | OXYGEN SATURATION: 99 %

## 2024-09-16 DIAGNOSIS — Z12.31 ENCOUNTER FOR SCREENING MAMMOGRAM FOR MALIGNANT NEOPLASM OF BREAST: ICD-10-CM

## 2024-09-16 DIAGNOSIS — R56.9 SEIZURE (MULTI): ICD-10-CM

## 2024-09-16 DIAGNOSIS — E78.2 MIXED HYPERLIPIDEMIA: ICD-10-CM

## 2024-09-16 DIAGNOSIS — G47.9 SLEEP DIFFICULTIES: ICD-10-CM

## 2024-09-16 DIAGNOSIS — E66.01 MORBID OBESITY (MULTI): ICD-10-CM

## 2024-09-16 DIAGNOSIS — Z11.51 SCREENING FOR HPV (HUMAN PAPILLOMAVIRUS): ICD-10-CM

## 2024-09-16 DIAGNOSIS — I10 HYPERTENSION, UNSPECIFIED TYPE: ICD-10-CM

## 2024-09-16 DIAGNOSIS — Z11.59 ENCOUNTER FOR HEPATITIS C SCREENING TEST FOR LOW RISK PATIENT: ICD-10-CM

## 2024-09-16 DIAGNOSIS — Z12.72 VAGINAL SMEAR: ICD-10-CM

## 2024-09-16 DIAGNOSIS — Z23 ENCOUNTER FOR IMMUNIZATION: ICD-10-CM

## 2024-09-16 DIAGNOSIS — Z00.00 WELL ADULT EXAM: Primary | ICD-10-CM

## 2024-09-16 DIAGNOSIS — Z01.419 WELL WOMAN EXAM: ICD-10-CM

## 2024-09-16 LAB — HCV AB SER QL: NONREACTIVE

## 2024-09-16 PROCEDURE — 36415 COLL VENOUS BLD VENIPUNCTURE: CPT | Performed by: NURSE PRACTITIONER

## 2024-09-16 PROCEDURE — G0008 ADMIN INFLUENZA VIRUS VAC: HCPCS | Performed by: NURSE PRACTITIONER

## 2024-09-16 PROCEDURE — 86803 HEPATITIS C AB TEST: CPT | Mod: WESLAB | Performed by: NURSE PRACTITIONER

## 2024-09-16 PROCEDURE — 99396 PREV VISIT EST AGE 40-64: CPT | Performed by: NURSE PRACTITIONER

## 2024-09-16 RX ORDER — FLUTICASONE FUROATE AND VILANTEROL TRIFENATATE 100; 25 UG/1; UG/1
POWDER RESPIRATORY (INHALATION)
COMMUNITY
Start: 2024-08-29

## 2024-09-16 RX ORDER — TRAZODONE HYDROCHLORIDE 50 MG/1
TABLET ORAL
COMMUNITY
Start: 2024-08-29

## 2024-09-16 ASSESSMENT — LIFESTYLE VARIABLES
AUDIT-C TOTAL SCORE: 0
HOW OFTEN DO YOU HAVE SIX OR MORE DRINKS ON ONE OCCASION: NEVER
HOW MANY STANDARD DRINKS CONTAINING ALCOHOL DO YOU HAVE ON A TYPICAL DAY: PATIENT DOES NOT DRINK
SKIP TO QUESTIONS 9-10: 1
HOW OFTEN DO YOU HAVE A DRINK CONTAINING ALCOHOL: NEVER

## 2024-09-16 ASSESSMENT — ACTIVITIES OF DAILY LIVING (ADL)
GROCERY_SHOPPING: INDEPENDENT
MANAGING_FINANCES: INDEPENDENT
BATHING: INDEPENDENT
DRESSING: INDEPENDENT
TAKING_MEDICATION: INDEPENDENT
DOING_HOUSEWORK: NEEDS ASSISTANCE

## 2024-09-16 ASSESSMENT — ENCOUNTER SYMPTOMS
BACK PAIN: 1
ARTHRALGIAS: 1

## 2024-09-16 ASSESSMENT — PATIENT HEALTH QUESTIONNAIRE - PHQ9
SUM OF ALL RESPONSES TO PHQ9 QUESTIONS 1 AND 2: 0
1. LITTLE INTEREST OR PLEASURE IN DOING THINGS: NOT AT ALL
2. FEELING DOWN, DEPRESSED OR HOPELESS: NOT AT ALL

## 2024-09-16 ASSESSMENT — PAIN SCALES - GENERAL: PAINLEVEL: 0-NO PAIN

## 2024-09-16 NOTE — PROGRESS NOTES
"Subjective   Patient ID: Odilia Oneal is a 53 y.o. female who presents for Medicare Annual Wellness Visit Subsequent (Pt here for medicare physical. Pt is fasting. Pt had seizure this morning) and note (Pt needs note stating she can't work in regards to her back pain).    Here for a well visit, sees dr parsons for her seizures, he has not given her another neurologist,saw dr nguyen, for breast reduction , needs a Rolator,        Review of Systems   Musculoskeletal:  Positive for arthralgias and back pain.       Objective   Pulse 74   Temp 36.6 °C (97.9 °F)   Resp 18   Ht 1.549 m (5' 1\")   Wt 128 kg (283 lb)   SpO2 99%   BMI 53.47 kg/m²     Physical Exam  Constitutional:       Appearance: Normal appearance. She is obese.   HENT:      Right Ear: Tympanic membrane normal.   Eyes:      Conjunctiva/sclera: Conjunctivae normal.   Cardiovascular:      Rate and Rhythm: Normal rate and regular rhythm.   Pulmonary:      Effort: Pulmonary effort is normal.   Abdominal:      General: Bowel sounds are normal.   Genitourinary:     Comments: PAP DONE NO CMT VERY LARGE BREASTS  EXAM NL  Musculoskeletal:      Cervical back: Normal range of motion.   Neurological:      Mental Status: She is alert and oriented to person, place, and time.   Psychiatric:         Behavior: Behavior normal.       Assessment/Plan   Encounter Diagnoses   Name Primary?   • Well adult exam Yes   • Seizure (Multi)    • Hypertension, unspecified type    • Mixed hyperlipidemia    • Sleep difficulties    • Encounter for screening mammogram for malignant neoplasm of breast    • Well woman exam    • Vaginal smear    • Screening for HPV (human papillomavirus)    • Encounter for immunization    • Encounter for hepatitis C screening test for low risk patient    • Morbid obesity (Multi)     DISCUSSED AND REVIEWED MEDICATIONS,  WILL DISCUSS WITH DR PARSONS WHO SHE SHOULD BE SEEING, HE IS RETIRING,PATSY CALL WITH LABS AND PREVEBATIVE SCRENING        "

## 2024-09-25 ENCOUNTER — APPOINTMENT (OUTPATIENT)
Dept: RADIOLOGY | Facility: HOSPITAL | Age: 54
End: 2024-09-25
Payer: MEDICARE

## 2024-09-26 ENCOUNTER — TELEPHONE (OUTPATIENT)
Dept: PRIMARY CARE | Facility: CLINIC | Age: 54
End: 2024-09-26

## 2024-09-26 ENCOUNTER — APPOINTMENT (OUTPATIENT)
Dept: PHARMACY | Facility: HOSPITAL | Age: 54
End: 2024-09-26
Payer: MEDICARE

## 2024-09-26 DIAGNOSIS — E66.9 OBESITY WITH SERIOUS COMORBIDITY, UNSPECIFIED CLASSIFICATION, UNSPECIFIED OBESITY TYPE: ICD-10-CM

## 2024-09-26 RX ORDER — TIRZEPATIDE 2.5 MG/.5ML
2.5 INJECTION, SOLUTION SUBCUTANEOUS
Qty: 2 ML | Refills: 0 | Status: SHIPPED | OUTPATIENT
Start: 2024-09-26

## 2024-09-26 NOTE — PROGRESS NOTES
Patient received the following medication education on Mounjaro:    - Counseled patient on MOA, expectations, side effects, duration of therapy, contraindications, administration, and monitoring parameters.  - Provided detailed dosing and administration counseling to ensure proper technique.   - Reviewed Mounjaro titration schedule, starting with 2.5 mg once weekly to 5mg starting on the 5th week. Patient verbalized understanding  - Counseled patient on the benefits of GLP-1ra glycemic control and weight loss  - Reviewed storage requirements of Mounjaro when not in use, and when to administer the medication if a dose is missed.  - Advised patient that they may experience improved satiety after meals and portion sizes of meals may be reduced as doses of Mounjaro increase.    Medication education provided by GILBERT Casillas, PharmD, BCPS

## 2024-10-02 LAB
CYTOLOGY CMNT CVX/VAG CYTO-IMP: NORMAL
HPV HR GENOTYPES PNL CVX NAA+PROBE: NEGATIVE
LAB AP HPV GENOTYPE QUESTION: NO
LAB AP HPV HR: NORMAL
LABORATORY COMMENT REPORT: NORMAL
MENSTRUAL HX REPORTED: NORMAL
PATH REPORT.TOTAL CANCER: NORMAL

## 2024-10-16 ENCOUNTER — HOSPITAL ENCOUNTER (OUTPATIENT)
Dept: RADIOLOGY | Facility: HOSPITAL | Age: 54
Discharge: HOME | End: 2024-10-16
Payer: MEDICARE

## 2024-10-16 VITALS — BODY MASS INDEX: 53.43 KG/M2 | WEIGHT: 283 LBS | HEIGHT: 61 IN

## 2024-10-16 DIAGNOSIS — Z12.31 ENCOUNTER FOR SCREENING MAMMOGRAM FOR MALIGNANT NEOPLASM OF BREAST: ICD-10-CM

## 2024-10-16 PROCEDURE — 77067 SCR MAMMO BI INCL CAD: CPT

## 2024-12-02 DIAGNOSIS — E55.9 VITAMIN D DEFICIENCY: ICD-10-CM

## 2024-12-02 RX ORDER — ERGOCALCIFEROL 1.25 MG/1
1 CAPSULE ORAL
Qty: 12 CAPSULE | Refills: 0 | Status: SHIPPED | OUTPATIENT
Start: 2024-12-02

## 2024-12-12 ENCOUNTER — TELEPHONE (OUTPATIENT)
Dept: PRIMARY CARE | Facility: CLINIC | Age: 54
End: 2024-12-12
Payer: MEDICARE

## 2024-12-12 DIAGNOSIS — G40.909 SEIZURE DISORDER (MULTI): ICD-10-CM

## 2024-12-12 NOTE — TELEPHONE ENCOUNTER
Pt called and states earring hole is all red and itches. Pt states there may be something in there. Pt does not wear earrings. Pt would like pk advice

## 2024-12-16 NOTE — TELEPHONE ENCOUNTER
Spoke with pt. Pt aware referral in for derm. Pt also states there is no rash and she will follow up with her dermatologist

## 2024-12-30 ENCOUNTER — TELEPHONE (OUTPATIENT)
Dept: PRIMARY CARE | Facility: CLINIC | Age: 54
End: 2024-12-30
Payer: MEDICARE

## 2025-01-07 ENCOUNTER — HOSPITAL ENCOUNTER (OUTPATIENT)
Dept: RADIOLOGY | Facility: CLINIC | Age: 55
Discharge: HOME | End: 2025-01-07
Payer: MEDICARE

## 2025-01-07 DIAGNOSIS — G40.909 EPILEPSY, UNSPECIFIED, NOT INTRACTABLE, WITHOUT STATUS EPILEPTICUS: ICD-10-CM

## 2025-01-07 DIAGNOSIS — G43.009 MIGRAINE WITHOUT AURA, NOT INTRACTABLE, WITHOUT STATUS MIGRAINOSUS: ICD-10-CM

## 2025-01-07 PROCEDURE — 70551 MRI BRAIN STEM W/O DYE: CPT

## 2025-01-07 PROCEDURE — 70551 MRI BRAIN STEM W/O DYE: CPT | Performed by: RADIOLOGY

## 2025-01-16 ENCOUNTER — APPOINTMENT (OUTPATIENT)
Dept: PRIMARY CARE | Facility: CLINIC | Age: 55
End: 2025-01-16
Payer: MEDICARE

## 2025-01-20 ENCOUNTER — TELEPHONE (OUTPATIENT)
Dept: PRIMARY CARE | Facility: CLINIC | Age: 55
End: 2025-01-20
Payer: MEDICARE

## 2025-01-20 DIAGNOSIS — R05.1 ACUTE COUGH: Primary | ICD-10-CM

## 2025-01-21 RX ORDER — ALBUTEROL SULFATE 0.83 MG/ML
2.5 SOLUTION RESPIRATORY (INHALATION) EVERY 6 HOURS
Qty: 75 ML | Refills: 1 | Status: SHIPPED | OUTPATIENT
Start: 2025-01-21

## 2025-01-21 RX ORDER — BENZONATATE 200 MG/1
200 CAPSULE ORAL 3 TIMES DAILY PRN
Qty: 42 CAPSULE | Refills: 3 | Status: SHIPPED | OUTPATIENT
Start: 2025-01-21 | End: 2025-07-20

## 2025-01-29 ENCOUNTER — OFFICE VISIT (OUTPATIENT)
Dept: PRIMARY CARE | Facility: CLINIC | Age: 55
End: 2025-01-29
Payer: MEDICARE

## 2025-01-29 VITALS
WEIGHT: 283.6 LBS | HEART RATE: 75 BPM | BODY MASS INDEX: 53.54 KG/M2 | HEIGHT: 61 IN | DIASTOLIC BLOOD PRESSURE: 84 MMHG | TEMPERATURE: 97.9 F | OXYGEN SATURATION: 98 % | RESPIRATION RATE: 18 BRPM | SYSTOLIC BLOOD PRESSURE: 124 MMHG

## 2025-01-29 DIAGNOSIS — R05.1 ACUTE COUGH: ICD-10-CM

## 2025-01-29 DIAGNOSIS — E66.01 MORBID OBESITY (MULTI): Primary | ICD-10-CM

## 2025-01-29 DIAGNOSIS — Z13.29 SCREENING FOR THYROID DISORDER: ICD-10-CM

## 2025-01-29 DIAGNOSIS — R63.5 WEIGHT GAIN: ICD-10-CM

## 2025-01-29 DIAGNOSIS — N61.0 CELLULITIS OF LEFT BREAST: ICD-10-CM

## 2025-01-29 DIAGNOSIS — R73.09 ELEVATED GLUCOSE: ICD-10-CM

## 2025-01-29 DIAGNOSIS — G47.30 SLEEP APNEA, UNSPECIFIED TYPE: ICD-10-CM

## 2025-01-29 LAB — POC HEMOGLOBIN A1C: 5.7 % (ref 4.2–6.5)

## 2025-01-29 PROCEDURE — 99214 OFFICE O/P EST MOD 30 MIN: CPT | Performed by: NURSE PRACTITIONER

## 2025-01-29 PROCEDURE — 83036 HEMOGLOBIN GLYCOSYLATED A1C: CPT | Mod: MUE | Performed by: NURSE PRACTITIONER

## 2025-01-29 PROCEDURE — 3008F BODY MASS INDEX DOCD: CPT | Performed by: NURSE PRACTITIONER

## 2025-01-29 PROCEDURE — 1036F TOBACCO NON-USER: CPT | Performed by: NURSE PRACTITIONER

## 2025-01-29 RX ORDER — BENZONATATE 200 MG/1
200 CAPSULE ORAL 3 TIMES DAILY PRN
Qty: 42 CAPSULE | Refills: 3 | Status: SHIPPED | OUTPATIENT
Start: 2025-01-29 | End: 2025-07-28

## 2025-01-29 RX ORDER — BENZONATATE 200 MG/1
200 CAPSULE ORAL 3 TIMES DAILY PRN
Qty: 42 CAPSULE | Refills: 3 | Status: SHIPPED | OUTPATIENT
Start: 2025-01-29 | End: 2025-01-29 | Stop reason: WASHOUT

## 2025-01-29 RX ORDER — SULFAMETHOXAZOLE AND TRIMETHOPRIM 800; 160 MG/1; MG/1
1 TABLET ORAL 2 TIMES DAILY
Qty: 28 TABLET | Refills: 0 | Status: SHIPPED | OUTPATIENT
Start: 2025-01-29 | End: 2025-02-12

## 2025-01-29 ASSESSMENT — ENCOUNTER SYMPTOMS
COUGH: 1
BREAST PAIN: 1
UNEXPECTED WEIGHT CHANGE: 1

## 2025-01-29 ASSESSMENT — PAIN SCALES - GENERAL: PAINLEVEL_OUTOF10: 8

## 2025-01-29 ASSESSMENT — PATIENT HEALTH QUESTIONNAIRE - PHQ9
2. FEELING DOWN, DEPRESSED OR HOPELESS: NOT AT ALL
1. LITTLE INTEREST OR PLEASURE IN DOING THINGS: NOT AT ALL
SUM OF ALL RESPONSES TO PHQ9 QUESTIONS 1 AND 2: 0

## 2025-01-29 NOTE — PROGRESS NOTES
"Subjective   Patient ID: Odilia Oneal is a 54 y.o. female who presents for Cough (PT C/O ONGOING COUGH X 2 WEEKS. COUGH IS WORSE AT NIGHT. ) and Breast Pain (PT C/O LEFT BREAST PAIN X 2 DAYS. PT HAS ITCHING AND REDNESS ).    Cough  This is a chronic problem. The current episode started 1 to 4 weeks ago. The problem has been waxing and waning. The problem occurs every few hours. The cough is Non-productive. Nothing aggravates the symptoms. She has tried OTC cough suppressant for the symptoms. The treatment provided no relief.   Breast Pain  Associated Symptoms: breast pain    Affected side:  Left       Review of Systems   Constitutional:  Positive for unexpected weight change.   Respiratory:  Positive for cough.        Objective   /84   Pulse 75   Temp 36.6 °C (97.9 °F)   Resp 18   Ht 1.549 m (5' 1\")   Wt 129 kg (283 lb 9.6 oz)   SpO2 98%   BMI 53.59 kg/m²     Physical Exam  Constitutional:       Appearance: Normal appearance. She is obese.   HENT:      Right Ear: Tympanic membrane normal.      Left Ear: Tympanic membrane normal.      Nose: Nose normal.   Eyes:      Conjunctiva/sclera: Conjunctivae normal.   Cardiovascular:      Rate and Rhythm: Normal rate and regular rhythm.      Pulses: Normal pulses.      Heart sounds: Normal heart sounds.   Pulmonary:      Effort: Pulmonary effort is normal.      Breath sounds: Normal breath sounds.   Genitourinary:     Comments: Very large breasts  Skin:     Comments: Left breast around nipple bright red warm tender totouch no dc   Neurological:      Mental Status: She is alert and oriented to person, place, and time.   Psychiatric:         Behavior: Behavior normal.      Comments: Seeing counsleor          Assessment/Plan   Problem List Items Addressed This Visit             ICD-10-CM    Morbid obesity (Multi) - Primary E66.01    Relevant Orders    Comprehensive Metabolic Panel    Referral to Nutrition Services     Other Visit Diagnoses         Codes    Weight gain "     R63.5    Relevant Orders    Comprehensive Metabolic Panel    Sleep apnea, unspecified type     G47.30    Acute cough     R05.1    Relevant Medications    benzonatate (Tessalon) 200 mg capsule    Cellulitis of left breast     N61.0    Relevant Medications    sulfamethoxazole-trimethoprim (Bactrim DS) 800-160 mg tablet    Other Relevant Orders    CBC and Auto Differential    Screening for thyroid disorder     Z13.29    Relevant Orders    TSH with reflex to Free T4 if abnormal    Elevated glucose     R73.09    Relevant Orders    POCT glycosylated hemoglobin (Hb A1C) manually resulted (Completed)          Return in 10 days if something changes come sooner

## 2025-01-30 DIAGNOSIS — E03.9 HYPOTHYROIDISM, UNSPECIFIED TYPE: Primary | ICD-10-CM

## 2025-01-30 LAB
ALBUMIN SERPL-MCNC: 4.3 G/DL (ref 3.6–5.1)
ALP SERPL-CCNC: 124 U/L (ref 37–153)
ALT SERPL-CCNC: 17 U/L (ref 6–29)
ANION GAP SERPL CALCULATED.4IONS-SCNC: 8 MMOL/L (CALC) (ref 7–17)
AST SERPL-CCNC: 17 U/L (ref 10–35)
BASOPHILS # BLD AUTO: 21 CELLS/UL (ref 0–200)
BASOPHILS NFR BLD AUTO: 0.3 %
BILIRUB SERPL-MCNC: 0.3 MG/DL (ref 0.2–1.2)
BUN SERPL-MCNC: 16 MG/DL (ref 7–25)
CALCIUM SERPL-MCNC: 9 MG/DL (ref 8.6–10.4)
CHLORIDE SERPL-SCNC: 109 MMOL/L (ref 98–110)
CO2 SERPL-SCNC: 23 MMOL/L (ref 20–32)
CREAT SERPL-MCNC: 0.81 MG/DL (ref 0.5–1.03)
EGFRCR SERPLBLD CKD-EPI 2021: 86 ML/MIN/1.73M2
EOSINOPHIL # BLD AUTO: 104 CELLS/UL (ref 15–500)
EOSINOPHIL NFR BLD AUTO: 1.5 %
ERYTHROCYTE [DISTWIDTH] IN BLOOD BY AUTOMATED COUNT: 13 % (ref 11–15)
GLUCOSE SERPL-MCNC: 102 MG/DL (ref 65–99)
HCT VFR BLD AUTO: 39.1 % (ref 35–45)
HGB BLD-MCNC: 12.8 G/DL (ref 11.7–15.5)
LYMPHOCYTES # BLD AUTO: 2091 CELLS/UL (ref 850–3900)
LYMPHOCYTES NFR BLD AUTO: 30.3 %
MCH RBC QN AUTO: 29.5 PG (ref 27–33)
MCHC RBC AUTO-ENTMCNC: 32.7 G/DL (ref 32–36)
MCV RBC AUTO: 90.1 FL (ref 80–100)
MONOCYTES # BLD AUTO: 435 CELLS/UL (ref 200–950)
MONOCYTES NFR BLD AUTO: 6.3 %
NEUTROPHILS # BLD AUTO: 4250 CELLS/UL (ref 1500–7800)
NEUTROPHILS NFR BLD AUTO: 61.6 %
PLATELET # BLD AUTO: 335 THOUSAND/UL (ref 140–400)
PMV BLD REES-ECKER: 10.1 FL (ref 7.5–12.5)
POTASSIUM SERPL-SCNC: 4.3 MMOL/L (ref 3.5–5.3)
PROT SERPL-MCNC: 6.4 G/DL (ref 6.1–8.1)
RBC # BLD AUTO: 4.34 MILLION/UL (ref 3.8–5.1)
SODIUM SERPL-SCNC: 140 MMOL/L (ref 135–146)
T4 FREE SERPL-MCNC: 1.3 NG/DL (ref 0.8–1.8)
TSH SERPL-ACNC: 5.26 MIU/L
WBC # BLD AUTO: 6.9 THOUSAND/UL (ref 3.8–10.8)

## 2025-01-30 RX ORDER — LEVOTHYROXINE SODIUM 50 UG/1
50 TABLET ORAL DAILY
Qty: 30 TABLET | Refills: 2 | Status: SHIPPED | OUTPATIENT
Start: 2025-01-30 | End: 2026-01-30

## 2025-02-10 ENCOUNTER — OFFICE VISIT (OUTPATIENT)
Dept: PRIMARY CARE | Facility: CLINIC | Age: 55
End: 2025-02-10
Payer: MEDICARE

## 2025-02-10 VITALS
SYSTOLIC BLOOD PRESSURE: 120 MMHG | BODY MASS INDEX: 53.05 KG/M2 | WEIGHT: 281 LBS | HEART RATE: 76 BPM | RESPIRATION RATE: 18 BRPM | TEMPERATURE: 97.3 F | HEIGHT: 61 IN | DIASTOLIC BLOOD PRESSURE: 80 MMHG | OXYGEN SATURATION: 98 %

## 2025-02-10 DIAGNOSIS — N61.0 CELLULITIS OF LEFT BREAST: ICD-10-CM

## 2025-02-10 DIAGNOSIS — Z09 FOLLOW-UP EXAM: Primary | ICD-10-CM

## 2025-02-10 PROCEDURE — 99212 OFFICE O/P EST SF 10 MIN: CPT | Performed by: NURSE PRACTITIONER

## 2025-02-10 PROCEDURE — 1036F TOBACCO NON-USER: CPT | Performed by: NURSE PRACTITIONER

## 2025-02-10 PROCEDURE — 3008F BODY MASS INDEX DOCD: CPT | Performed by: NURSE PRACTITIONER

## 2025-02-10 RX ORDER — VERAPAMIL HYDROCHLORIDE 40 MG/1
40 TABLET ORAL DAILY
COMMUNITY
Start: 2025-02-08

## 2025-02-10 ASSESSMENT — PATIENT HEALTH QUESTIONNAIRE - PHQ9
1. LITTLE INTEREST OR PLEASURE IN DOING THINGS: NOT AT ALL
SUM OF ALL RESPONSES TO PHQ9 QUESTIONS 1 AND 2: 0
2. FEELING DOWN, DEPRESSED OR HOPELESS: NOT AT ALL

## 2025-02-10 ASSESSMENT — PAIN SCALES - GENERAL: PAINLEVEL_OUTOF10: 0-NO PAIN

## 2025-02-10 NOTE — PROGRESS NOTES
"Subjective   Patient ID: Odilia Oneal is a 54 y.o. female who presents for Follow-up (PT IS HERE FOR 10 DAY FOLLOW UP FOR BREAST PAIN / CELLULITIS. PT STATES SHE DID NOT COMPLETE ANTIBIOTIC, UNSURE HOW MANY DAYS SHE TOOK. PT STATES ANTIBIOTIC CAUSED CONSTIPATION ).    Here for followup of left breast cellulitis, could not finish meds. , doing better         Review of Systems   Skin:  Positive for rash.       Objective   /80   Pulse 76   Temp 36.3 °C (97.3 °F)   Resp 18   Ht 1.549 m (5' 1\")   Wt 127 kg (281 lb)   SpO2 98%   BMI 53.09 kg/m²     Physical Exam  Constitutional:       Appearance: Normal appearance.   Skin:     Comments: Left breastn no redness not warm skin nl  breast large   Neurological:      Mental Status: She is alert.         Assessment/Plan   Problem List Items Addressed This Visit    None  Visit Diagnoses         Codes    Follow-up exam    -  Primary Z09    Cellulitis of left breast     N61.0        Return if reoccurs return for a wellvisit and pap       "

## 2025-02-12 NOTE — PROGRESS NOTES
Physical Therapy Evaluation and Treatment     Patient Name: Odilia Oneal  MRN: [id number]  Encounter date: 2/13/2025       Visit # Visit count could not be calculated. Make sure you are using a visit which is associated with an episode. of ***  Visits/Dates Authorized: ***    Current Problem:   Problem List Items Addressed This Visit    None    Precautions:          Subjective Evaluation    History of Present Illness  Mechanism of injury: 53 yo female presents with c/o neck pain.             Objective    Vitals:       Cervical/Thoracic Spine    Lumbar Spine         Balance:  {Balance Assessments:66857}    Other Outcome Measures:       Treatments:     Therapeutic Exercise 72624:     Neuromuscular Re-Ed 99601:     Therapeutic Activity 74480:     Gait Training 05249:     Manual Therapy 26317:     Modalities:   {Modalities Used:52001}      HEP / Access Codes:       Assessment & Plan     Assessment  Impairments: abnormal muscle tone, abnormal or restricted ROM, impaired physical strength, lacks appropriate home exercise program and pain with function  Assessment details: Patient presents with neck pain with mobility deficits. Key impairments include: decreased ROM and strength, postural deficits, and pain with functional activities such as driving, reading, and sitting. Patient would benefit from skilled physical therapy services to address these impairments and restore normal ROM and strength to reduce pain and improving activity participation.                      Prognosis: good    Plan  Therapy options: will be seen for skilled physical therapy services  Planned modality interventions: TENS, traction, thermotherapy (hydrocollator packs), cryotherapy and electrical stimulation/Russian stimulation  Planned therapy interventions: abdominal trunk stabilization, manual therapy, neuromuscular re-education, postural training, soft tissue mobilization, spinal/joint mobilization, home exercise program, functional ROM exercises,  flexibility, body mechanics training, strengthening and stretching  Treatment plan discussed with: patient     {Complexities:82558}     Post-Treatment Symptoms:       Goals:

## 2025-02-13 ENCOUNTER — APPOINTMENT (OUTPATIENT)
Dept: PHYSICAL THERAPY | Facility: CLINIC | Age: 55
End: 2025-02-13
Payer: MEDICAID

## 2025-02-24 DIAGNOSIS — E55.9 VITAMIN D DEFICIENCY: ICD-10-CM

## 2025-02-25 RX ORDER — ERGOCALCIFEROL 1.25 MG/1
1 CAPSULE ORAL
Qty: 12 CAPSULE | Refills: 0 | Status: SHIPPED | OUTPATIENT
Start: 2025-03-02

## 2025-03-04 ENCOUNTER — APPOINTMENT (OUTPATIENT)
Dept: OTOLARYNGOLOGY | Facility: CLINIC | Age: 55
End: 2025-03-04
Payer: MEDICARE

## 2025-03-04 VITALS — HEIGHT: 61 IN | WEIGHT: 280 LBS | BODY MASS INDEX: 52.87 KG/M2

## 2025-03-04 DIAGNOSIS — L29.9 EAR ITCH: ICD-10-CM

## 2025-03-04 DIAGNOSIS — H90.3 BILATERAL SENSORINEURAL HEARING LOSS: Primary | ICD-10-CM

## 2025-03-04 PROCEDURE — 3008F BODY MASS INDEX DOCD: CPT | Performed by: OTOLARYNGOLOGY

## 2025-03-04 PROCEDURE — 1036F TOBACCO NON-USER: CPT | Performed by: OTOLARYNGOLOGY

## 2025-03-04 PROCEDURE — 99213 OFFICE O/P EST LOW 20 MIN: CPT | Performed by: OTOLARYNGOLOGY

## 2025-03-04 NOTE — PROGRESS NOTES
Chief Complaint   Patient presents with    Follow-up     EP- EAR CK, EAR PAIN      HPI:  Odilia Oneal is a 54 y.o. female who has a history of bilateral sensorineural hearing loss and wears hearing aids complains of 4-month history of some increased hearing loss and ear pressure.  Hearing aids not working as well.  They feel little tender in her ears.  Gets dog hair in your ears as well from her service dog, Isha.    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    Personal history of other diseases of the nervous system and sense organs     History of obstructive sleep apnea    Unspecified convulsions (Multi)     Convulsions     Past Surgical History:   Procedure Laterality Date    MR HEAD ANGIO WO IV CONTRAST  6/23/2020    MR HEAD ANGIO WO IV CONTRAST 6/23/2020 [facility]    MR NECK ANGIO WO IV CONTRAST  6/23/2020    MR NECK ANGIO WO IV CONTRAST 6/23/2020 [facility]    OTHER SURGICAL HISTORY  02/18/2021    Tonsillectomy    OTHER SURGICAL HISTORY  02/18/2021    Shoulder surgery    OTHER SURGICAL HISTORY  02/18/2021    Back surgery    OTHER SURGICAL HISTORY  09/29/2022    Knee surgery         Medications:     Current Outpatient Medications:     albuterol 2.5 mg /3 mL (0.083 %) nebulizer solution, Take 3 mL (2.5 mg) by nebulization every 6 hours., Disp: 75 mL, Rfl: 1    albuterol 90 mcg/actuation inhaler, Inhale 2 puffs every 4 hours., Disp: , Rfl:     Breo Ellipta 100-25 mcg/dose inhaler, Inhale 1 puff once daily., Disp: , Rfl:     dicyclomine (Bentyl) 10 mg capsule, Take 1 capsule (10 mg) by mouth 4 times a day., Disp: , Rfl:     ergocalciferol (Vitamin D-2) 1250 mcg (50,000 units) capsule, Take 1 capsule by mouth once a week, Disp: 12 capsule, Rfl: 0    lamoTRIgine (LaMICtal) 200 mg tablet, Take 1 tablet (200 mg) by mouth 2 times a day., Disp: 180 tablet, Rfl: 3    levothyroxine (Synthroid,  Levoxyl) 50 mcg tablet, Take 1 tablet (50 mcg) by mouth early in the morning.. Take on an empty stomach at the same time each day, either 30 to 60 minutes prior to breakfast, Disp: 30 tablet, Rfl: 2    meclizine (Antivert) 25 mg tablet, Take 1 tablet (25 mg) by mouth 3 times a day as needed., Disp: , Rfl:     pantoprazole (ProtoNix) 20 mg EC tablet, Take 1 tablet (20 mg) by mouth once daily., Disp: , Rfl:     propranolol (Inderal) 20 mg tablet, Take 1 tablet (20 mg) by mouth 4 times a day., Disp: , Rfl:     simvastatin (Zocor) 20 mg tablet, Take 1 tablet (20 mg) by mouth once daily in the evening., Disp: 90 tablet, Rfl: 0    traZODone (Desyrel) 50 mg tablet, Take 1 tablet (50 mg) by mouth once daily at bedtime., Disp: , Rfl:     Trokendi  mg capsule,extended release 24hr, Take 2 capsules by mouth 2 times a day., Disp: 360 capsule, Rfl: 3    venlafaxine XR (Effexor-XR) 150 mg 24 hr capsule, Take 1 capsule (150 mg) by mouth once daily., Disp: , Rfl:     verapamil (Calan) 40 mg tablet, Take 1 tablet (40 mg) by mouth once daily., Disp: , Rfl:     benzonatate (Tessalon) 200 mg capsule, Take 1 capsule (200 mg) by mouth 3 times a day as needed for cough. Do not crush or chew. (Patient not taking: Reported on 3/4/2025), Disp: 42 capsule, Rfl: 3    fluticasone (Flonase) 50 mcg/actuation nasal spray, Administer 1 spray into each nostril once daily. Shake gently. Before first use, prime pump. After use, clean tip and replace cap., Disp: 16 g, Rfl: 5    tirzepatide (Mounjaro) 2.5 mg/0.5 mL pen injector, Inject 2.5 mg under the skin every 7 days. (Patient not taking: Reported on 1/29/2025), Disp: 2 mL, Rfl: 0    walker (Ultra-Light Rollator) misc, Use as directed. (Patient not taking: Reported on 1/29/2025), Disp: 1 each, Rfl: 0     Allergies:  Allergies   Allergen Reactions    Aspirin Nausea/vomiting    Chlorhexidine Unknown    Chlorhexidine Gluconate Other    Codeine Other and Nausea/vomiting    Doxycycline Unknown     "Ibuprofen Other and Nausea/vomiting    Penicillins Nausea/vomiting    Adhesive Tape-Silicones Rash and Other    Clindamycin Itching    Latex Other and Rash    Nickel Rash        ROS:  Review of systems normal unless stated otherwise in the HPI and/or PMH.    Physical Exam:  Height 1.549 m (5' 1\"), weight 127 kg (280 lb). Body mass index is 52.91 kg/m².     GENERAL APPEARANCE: Well developed and well nourished.  Alert and oriented in no acute distress.  Normal vocal quality.      HEAD/FACE: No erythema or edema or facial tenderness.  Normal facial nerve function bilaterally.    EAR:       EXTERNAL: Normal pinnas and external auditory canals without lesion or obstructing wax.  Mild wax removed.  A few dog hairs removed as well.  Microscope and alligators and suction.       MIDDLE EAR: Tympanic membranes intact and mobile with normal landmarks.  Middle ear space appears well aerated.       TUBE STATUS: N/A       MASTOID CAVITY: N/A       HEARING: Gross hearing assessment is within normal limits.      NOSE:       VISUALIZED USING: Anterior rhinoscopy with headlight and nasal speculum.       DORSUM: Midline, nontraumatic appearance.       MUCOSA: Normal-appearing.       SECRETIONS: Normal.       SEPTUM: Midline and nonobstructing.       INFERIOR TURBINATES: Normal.       MIDDLE TURBINATES/MEATUS: N/A       BLEEDING: N/A         ORAL CAVITY/PHARYNX:       TEETH: Adequate dentition.       TONGUE: No mass or lesion.  Normal mobility.       FLOOR OF MOUTH: No mass or lesion.       PALATE: Normal hard palate, soft palate, and uvula.       OROPHARYNX: Normal without mass or lesion.  Tonsils absent       BUCCAL MUCOSA/GBS: Normal without mass or lesion.       LIPS: Normal.    LARYNX/HYPOPHARYNX/NASOPHARYNX: N/A    NECK: No palpable masses or abnormal adenopathy.  Trachea is midline.    THYROID: No thyromegaly or palpable nodule.    SALIVARY GLANDS: Normal bilateral parotid and submandibular glands by inspection and " palpation.    TMJ's: Normal.    NEURO: Cranial nerve exam grossly normal bilaterally.       Assessment/Plan   Odilia was seen today for follow-up.  Diagnoses and all orders for this visit:  Bilateral sensorineural hearing loss (Primary)  Ear itch    Reassured.  Head neck exam looks okay.  Recommend she have a repeat hearing test and discussed with her audiologist potentially adjusting her hearing aids and see if she needs new domes or molds..  No evidence of any throat pathology or facial swelling.  Follow up if symptoms worsen or fail to improve.     Kobi Monge MD

## 2025-03-10 ENCOUNTER — APPOINTMENT (OUTPATIENT)
Dept: PRIMARY CARE | Facility: CLINIC | Age: 55
End: 2025-03-10
Payer: MEDICARE

## 2025-03-24 ENCOUNTER — APPOINTMENT (OUTPATIENT)
Dept: PRIMARY CARE | Facility: CLINIC | Age: 55
End: 2025-03-24
Payer: MEDICARE

## 2025-03-24 ENCOUNTER — TELEPHONE (OUTPATIENT)
Dept: PRIMARY CARE | Facility: CLINIC | Age: 55
End: 2025-03-24
Payer: MEDICARE

## 2025-03-24 DIAGNOSIS — M79.673 FOOT ARCH PAIN, UNSPECIFIED LATERALITY: Primary | ICD-10-CM

## 2025-03-25 NOTE — TELEPHONE ENCOUNTER
L/M TO NOTIFY PT REFERRAL TO PODIATRY PLACED, LEFT NUMBER FOR PT SCHEDULE. ADVISED PT TO LET US KNOW IF SHE WANTS TO TRY PT FOR KNEE/ AND OR CAN DISCUSS AT [facility].

## 2025-04-10 ENCOUNTER — APPOINTMENT (OUTPATIENT)
Dept: PRIMARY CARE | Facility: CLINIC | Age: 55
End: 2025-04-10
Payer: MEDICARE

## 2025-04-10 ENCOUNTER — TELEPHONE (OUTPATIENT)
Dept: PRIMARY CARE | Facility: CLINIC | Age: 55
End: 2025-04-10
Payer: MEDICARE

## 2025-04-11 ENCOUNTER — OFFICE VISIT (OUTPATIENT)
Dept: PRIMARY CARE | Facility: CLINIC | Age: 55
End: 2025-04-11
Payer: MEDICARE

## 2025-04-11 VITALS — OXYGEN SATURATION: 98 % | DIASTOLIC BLOOD PRESSURE: 74 MMHG | SYSTOLIC BLOOD PRESSURE: 102 MMHG | HEART RATE: 71 BPM

## 2025-04-11 DIAGNOSIS — J45.30 MILD PERSISTENT ASTHMA WITHOUT COMPLICATION (HHS-HCC): ICD-10-CM

## 2025-04-11 DIAGNOSIS — N76.0 VAGINAL INFECTION: ICD-10-CM

## 2025-04-11 DIAGNOSIS — R05.1 ACUTE COUGH: Primary | ICD-10-CM

## 2025-04-11 PROCEDURE — G2211 COMPLEX E/M VISIT ADD ON: HCPCS | Performed by: NURSE PRACTITIONER

## 2025-04-11 PROCEDURE — 1036F TOBACCO NON-USER: CPT | Performed by: NURSE PRACTITIONER

## 2025-04-11 PROCEDURE — 99213 OFFICE O/P EST LOW 20 MIN: CPT | Performed by: NURSE PRACTITIONER

## 2025-04-11 RX ORDER — CEFDINIR 300 MG/1
300 CAPSULE ORAL 2 TIMES DAILY
Qty: 20 CAPSULE | Refills: 0 | Status: SHIPPED | OUTPATIENT
Start: 2025-04-11 | End: 2025-04-21

## 2025-04-11 RX ORDER — ESLICARBAZEPINE ACETATE 400 MG/1
TABLET ORAL
COMMUNITY
Start: 2025-03-27

## 2025-04-11 RX ORDER — FLUCONAZOLE 150 MG/1
150 TABLET ORAL DAILY
Qty: 3 TABLET | Refills: 0 | Status: SHIPPED | OUTPATIENT
Start: 2025-04-11

## 2025-04-11 ASSESSMENT — PAIN SCALES - GENERAL: PAINLEVEL_OUTOF10: 0-NO PAIN

## 2025-04-11 ASSESSMENT — ENCOUNTER SYMPTOMS
DEPRESSION: 0
LOSS OF SENSATION IN FEET: 0
OCCASIONAL FEELINGS OF UNSTEADINESS: 0
COUGH: 1

## 2025-04-11 NOTE — PROGRESS NOTES
Subjective   Patient ID: Odilia Oneal is a 54 y.o. female who presents for Cough (Patient here for cough, congestion and fatigue ).    Cough     Has cough and asthma and lives in home others are not washing hands   Having breast reduction one week   Using nebulizer     Review of Systems   Respiratory:  Positive for cough.        Objective   /74 (BP Location: Left arm, Patient Position: Sitting, BP Cuff Size: Adult)   Pulse 71   SpO2 98%     Physical Exam  HENT:      Head: Normocephalic.      Right Ear: Tympanic membrane normal.      Left Ear: Tympanic membrane normal.      Nose: Congestion and rhinorrhea present.      Mouth/Throat:      Mouth: Mucous membranes are moist.      Pharynx: Oropharynx is clear.   Eyes:      Conjunctiva/sclera: Conjunctivae normal.      Pupils: Pupils are equal, round, and reactive to light.   Cardiovascular:      Rate and Rhythm: Normal rate and regular rhythm.      Heart sounds: No murmur heard.  Pulmonary:      Effort: Pulmonary effort is normal.      Breath sounds: Normal breath sounds.      Comments: Diminished breath sounds   Musculoskeletal:      Cervical back: Neck supple.   Neurological:      Mental Status: She is alert.         Assessment/Plan   Problem List Items Addressed This Visit    None  Visit Diagnoses         Codes    Acute cough    -  Primary R05.1    Relevant Medications    cefdinir (Omnicef) 300 mg capsule    Mild persistent asthma without complication ([facility])     J45.30    Vaginal infection     N76.0    Relevant Medications    fluconazole (Diflucan) 150 mg tablet        Tesslon perssel at home

## 2025-04-12 LAB — QUEST FLEXITEST2 RESULTS:: NORMAL

## 2025-04-14 ENCOUNTER — TELEPHONE (OUTPATIENT)
Dept: PRIMARY CARE | Facility: CLINIC | Age: 55
End: 2025-04-14
Payer: MEDICARE

## 2025-04-14 DIAGNOSIS — J01.90 ACUTE NON-RECURRENT SINUSITIS, UNSPECIFIED LOCATION: Primary | ICD-10-CM

## 2025-04-14 RX ORDER — AZITHROMYCIN 250 MG/1
TABLET, FILM COATED ORAL
Qty: 6 TABLET | Refills: 0 | Status: SHIPPED | OUTPATIENT
Start: 2025-04-14 | End: 2025-04-19

## 2025-04-16 ENCOUNTER — APPOINTMENT (OUTPATIENT)
Dept: PRIMARY CARE | Facility: CLINIC | Age: 55
End: 2025-04-16
Payer: MEDICARE

## 2025-04-22 ENCOUNTER — OFFICE VISIT (OUTPATIENT)
Dept: PRIMARY CARE | Facility: CLINIC | Age: 55
End: 2025-04-22
Payer: MEDICARE

## 2025-04-22 ENCOUNTER — HOSPITAL ENCOUNTER (OUTPATIENT)
Dept: RADIOLOGY | Facility: CLINIC | Age: 55
Discharge: HOME | End: 2025-04-22
Payer: MEDICARE

## 2025-04-22 VITALS
HEIGHT: 61 IN | SYSTOLIC BLOOD PRESSURE: 124 MMHG | DIASTOLIC BLOOD PRESSURE: 80 MMHG | BODY MASS INDEX: 53.66 KG/M2 | RESPIRATION RATE: 18 BRPM | WEIGHT: 284.2 LBS | HEART RATE: 97 BPM | OXYGEN SATURATION: 97 % | TEMPERATURE: 97.2 F

## 2025-04-22 DIAGNOSIS — J40 BRONCHITIS: Primary | ICD-10-CM

## 2025-04-22 DIAGNOSIS — L02.91 ABSCESS: ICD-10-CM

## 2025-04-22 DIAGNOSIS — J40 BRONCHITIS: ICD-10-CM

## 2025-04-22 PROCEDURE — 71046 X-RAY EXAM CHEST 2 VIEWS: CPT

## 2025-04-22 PROCEDURE — 1036F TOBACCO NON-USER: CPT | Performed by: NURSE PRACTITIONER

## 2025-04-22 PROCEDURE — 99213 OFFICE O/P EST LOW 20 MIN: CPT | Performed by: NURSE PRACTITIONER

## 2025-04-22 PROCEDURE — 3008F BODY MASS INDEX DOCD: CPT | Performed by: NURSE PRACTITIONER

## 2025-04-22 PROCEDURE — 71046 X-RAY EXAM CHEST 2 VIEWS: CPT | Performed by: RADIOLOGY

## 2025-04-22 RX ORDER — METHYLPREDNISOLONE 4 MG/1
TABLET ORAL
Qty: 21 TABLET | Refills: 0 | Status: SHIPPED | OUTPATIENT
Start: 2025-04-22 | End: 2025-04-28

## 2025-04-22 RX ORDER — BENZONATATE 200 MG/1
200 CAPSULE ORAL 3 TIMES DAILY PRN
Qty: 42 CAPSULE | Refills: 3 | Status: SHIPPED | OUTPATIENT
Start: 2025-04-22 | End: 2025-10-19

## 2025-04-22 RX ORDER — DOXYCYCLINE 100 MG/1
100 CAPSULE ORAL 2 TIMES DAILY
Qty: 20 CAPSULE | Refills: 0 | Status: SHIPPED | OUTPATIENT
Start: 2025-04-22 | End: 2025-04-22 | Stop reason: SINTOL

## 2025-04-22 ASSESSMENT — PAIN SCALES - GENERAL: PAINLEVEL_OUTOF10: 0-NO PAIN

## 2025-04-22 ASSESSMENT — ENCOUNTER SYMPTOMS: COUGH: 1

## 2025-04-22 NOTE — PROGRESS NOTES
"Subjective   Patient ID: Odilia Oneal is a 54 y.o. female who presents for Follow-up (PT IS HERE TO F/U FOR RECENT ILLNESS, MAY HAVE PNEUMONIA ).    Was to have breast reduction yesterday but it was cancelled becise of cough and wheezing, was seen last week better, here for follow up        Review of Systems   Respiratory:  Positive for cough.    Skin:  Positive for rash.       Objective   /80   Pulse 97   Temp 36.2 °C (97.2 °F)   Resp 18   Ht 1.549 m (5' 1\")   Wt 129 kg (284 lb 3.2 oz)   SpO2 97%   BMI 53.70 kg/m²     Physical Exam  Constitutional:       Appearance: Normal appearance. She is obese.   HENT:      Right Ear: Tympanic membrane normal.      Left Ear: Tympanic membrane normal.   Cardiovascular:      Rate and Rhythm: Normal rate and regular rhythm.      Pulses: Normal pulses.      Heart sounds: Normal heart sounds.   Pulmonary:      Comments: No wheezing audible  has a deep bro spasm cough productive   Skin:     Comments: Has a resolving abscess in pubic area not infected healing  no drainage   Neurological:      Mental Status: She is alert and oriented to person, place, and time.   Psychiatric:         Behavior: Behavior normal.       Assessment/Plan          "

## 2025-05-06 ENCOUNTER — PATIENT OUTREACH (OUTPATIENT)
Dept: CARE COORDINATION | Facility: CLINIC | Age: 55
End: 2025-05-06
Payer: MEDICARE

## 2025-05-07 ENCOUNTER — TELEPHONE (OUTPATIENT)
Dept: PRIMARY CARE | Facility: CLINIC | Age: 55
End: 2025-05-07

## 2025-05-07 ENCOUNTER — APPOINTMENT (OUTPATIENT)
Dept: PODIATRY | Facility: CLINIC | Age: 55
End: 2025-05-07
Payer: MEDICARE

## 2025-05-07 DIAGNOSIS — J40 BRONCHITIS: Primary | ICD-10-CM

## 2025-05-07 RX ORDER — AZITHROMYCIN 250 MG/1
TABLET, FILM COATED ORAL
Qty: 6 TABLET | Refills: 0 | Status: SHIPPED | OUTPATIENT
Start: 2025-05-07 | End: 2025-05-12

## 2025-05-25 ENCOUNTER — APPOINTMENT (OUTPATIENT)
Dept: RADIOLOGY | Facility: HOSPITAL | Age: 55
End: 2025-05-25
Payer: MEDICARE

## 2025-05-25 ENCOUNTER — APPOINTMENT (OUTPATIENT)
Dept: CARDIOLOGY | Facility: HOSPITAL | Age: 55
End: 2025-05-25
Payer: MEDICARE

## 2025-05-25 ENCOUNTER — HOSPITAL ENCOUNTER (EMERGENCY)
Facility: HOSPITAL | Age: 55
Discharge: HOME | End: 2025-05-25
Payer: MEDICARE

## 2025-05-25 VITALS
TEMPERATURE: 98.1 F | WEIGHT: 255 LBS | OXYGEN SATURATION: 96 % | HEIGHT: 61 IN | BODY MASS INDEX: 48.15 KG/M2 | DIASTOLIC BLOOD PRESSURE: 84 MMHG | SYSTOLIC BLOOD PRESSURE: 122 MMHG | RESPIRATION RATE: 14 BRPM | HEART RATE: 82 BPM

## 2025-05-25 DIAGNOSIS — I10 PRIMARY HYPERTENSION: Primary | ICD-10-CM

## 2025-05-25 LAB
ALBUMIN SERPL BCP-MCNC: 4.3 G/DL (ref 3.4–5)
ALP SERPL-CCNC: 127 U/L (ref 33–110)
ALT SERPL W P-5'-P-CCNC: 17 U/L (ref 7–45)
ANION GAP SERPL CALC-SCNC: 12 MMOL/L (ref 10–20)
APPEARANCE UR: CLEAR
APTT PPP: 35 SECONDS (ref 26–36)
AST SERPL W P-5'-P-CCNC: 18 U/L (ref 9–39)
BASOPHILS # BLD AUTO: 0.01 X10*3/UL (ref 0–0.1)
BASOPHILS NFR BLD AUTO: 0.1 %
BILIRUB SERPL-MCNC: 0.3 MG/DL (ref 0–1.2)
BILIRUB UR STRIP.AUTO-MCNC: NEGATIVE MG/DL
BNP SERPL-MCNC: 17 PG/ML (ref 0–99)
BUN SERPL-MCNC: 19 MG/DL (ref 6–23)
CALCIUM SERPL-MCNC: 9.1 MG/DL (ref 8.6–10.3)
CARDIAC TROPONIN I PNL SERPL HS: 3 NG/L (ref 0–13)
CARDIAC TROPONIN I PNL SERPL HS: <3 NG/L (ref 0–13)
CHLORIDE SERPL-SCNC: 109 MMOL/L (ref 98–107)
CO2 SERPL-SCNC: 25 MMOL/L (ref 21–32)
COLOR UR: YELLOW
CREAT SERPL-MCNC: 0.96 MG/DL (ref 0.5–1.05)
EGFRCR SERPLBLD CKD-EPI 2021: 70 ML/MIN/1.73M*2
EOSINOPHIL # BLD AUTO: 0.09 X10*3/UL (ref 0–0.7)
EOSINOPHIL NFR BLD AUTO: 1.2 %
ERYTHROCYTE [DISTWIDTH] IN BLOOD BY AUTOMATED COUNT: 13.7 % (ref 11.5–14.5)
GLUCOSE SERPL-MCNC: 104 MG/DL (ref 74–99)
GLUCOSE UR STRIP.AUTO-MCNC: NORMAL MG/DL
HCT VFR BLD AUTO: 40.9 % (ref 36–46)
HGB BLD-MCNC: 13.2 G/DL (ref 12–16)
IMM GRANULOCYTES # BLD AUTO: 0.03 X10*3/UL (ref 0–0.7)
IMM GRANULOCYTES NFR BLD AUTO: 0.4 % (ref 0–0.9)
INR PPP: 1 (ref 0.9–1.1)
KETONES UR STRIP.AUTO-MCNC: NEGATIVE MG/DL
LACTATE SERPL-SCNC: 0.7 MMOL/L (ref 0.4–2)
LEUKOCYTE ESTERASE UR QL STRIP.AUTO: ABNORMAL
LYMPHOCYTES # BLD AUTO: 1.88 X10*3/UL (ref 1.2–4.8)
LYMPHOCYTES NFR BLD AUTO: 24.1 %
MAGNESIUM SERPL-MCNC: 2.41 MG/DL (ref 1.6–2.4)
MCH RBC QN AUTO: 29.5 PG (ref 26–34)
MCHC RBC AUTO-ENTMCNC: 32.3 G/DL (ref 32–36)
MCV RBC AUTO: 92 FL (ref 80–100)
MONOCYTES # BLD AUTO: 0.43 X10*3/UL (ref 0.1–1)
MONOCYTES NFR BLD AUTO: 5.5 %
MUCOUS THREADS #/AREA URNS AUTO: NORMAL /LPF
NEUTROPHILS # BLD AUTO: 5.36 X10*3/UL (ref 1.2–7.7)
NEUTROPHILS NFR BLD AUTO: 68.7 %
NITRITE UR QL STRIP.AUTO: NEGATIVE
NRBC BLD-RTO: 0 /100 WBCS (ref 0–0)
PH UR STRIP.AUTO: 5 [PH]
PLATELET # BLD AUTO: 257 X10*3/UL (ref 150–450)
POTASSIUM SERPL-SCNC: 4.4 MMOL/L (ref 3.5–5.3)
PROT SERPL-MCNC: 7 G/DL (ref 6.4–8.2)
PROT UR STRIP.AUTO-MCNC: NEGATIVE MG/DL
PROTHROMBIN TIME: 10.8 SECONDS (ref 9.8–12.4)
RBC # BLD AUTO: 4.47 X10*6/UL (ref 4–5.2)
RBC # UR STRIP.AUTO: NEGATIVE MG/DL
RBC #/AREA URNS AUTO: NORMAL /HPF
SODIUM SERPL-SCNC: 142 MMOL/L (ref 136–145)
SP GR UR STRIP.AUTO: 1.03
SQUAMOUS #/AREA URNS AUTO: NORMAL /HPF
UROBILINOGEN UR STRIP.AUTO-MCNC: ABNORMAL MG/DL
WBC # BLD AUTO: 7.8 X10*3/UL (ref 4.4–11.3)
WBC #/AREA URNS AUTO: NORMAL /HPF

## 2025-05-25 PROCEDURE — 93005 ELECTROCARDIOGRAM TRACING: CPT

## 2025-05-25 PROCEDURE — 85730 THROMBOPLASTIN TIME PARTIAL: CPT | Performed by: NURSE PRACTITIONER

## 2025-05-25 PROCEDURE — 71046 X-RAY EXAM CHEST 2 VIEWS: CPT

## 2025-05-25 PROCEDURE — 36415 COLL VENOUS BLD VENIPUNCTURE: CPT | Performed by: NURSE PRACTITIONER

## 2025-05-25 PROCEDURE — 71046 X-RAY EXAM CHEST 2 VIEWS: CPT | Mod: FOREIGN READ | Performed by: RADIOLOGY

## 2025-05-25 PROCEDURE — 80053 COMPREHEN METABOLIC PANEL: CPT | Performed by: NURSE PRACTITIONER

## 2025-05-25 PROCEDURE — 83605 ASSAY OF LACTIC ACID: CPT | Performed by: NURSE PRACTITIONER

## 2025-05-25 PROCEDURE — 87086 URINE CULTURE/COLONY COUNT: CPT | Mod: GEALAB | Performed by: NURSE PRACTITIONER

## 2025-05-25 PROCEDURE — 83880 ASSAY OF NATRIURETIC PEPTIDE: CPT | Performed by: NURSE PRACTITIONER

## 2025-05-25 PROCEDURE — 84484 ASSAY OF TROPONIN QUANT: CPT | Performed by: NURSE PRACTITIONER

## 2025-05-25 PROCEDURE — 85025 COMPLETE CBC W/AUTO DIFF WBC: CPT | Performed by: NURSE PRACTITIONER

## 2025-05-25 PROCEDURE — 83735 ASSAY OF MAGNESIUM: CPT | Performed by: NURSE PRACTITIONER

## 2025-05-25 PROCEDURE — 85610 PROTHROMBIN TIME: CPT | Performed by: NURSE PRACTITIONER

## 2025-05-25 PROCEDURE — 99285 EMERGENCY DEPT VISIT HI MDM: CPT | Mod: 25

## 2025-05-25 PROCEDURE — 81001 URINALYSIS AUTO W/SCOPE: CPT | Performed by: NURSE PRACTITIONER

## 2025-05-25 ASSESSMENT — COLUMBIA-SUICIDE SEVERITY RATING SCALE - C-SSRS
1. IN THE PAST MONTH, HAVE YOU WISHED YOU WERE DEAD OR WISHED YOU COULD GO TO SLEEP AND NOT WAKE UP?: NO
6. HAVE YOU EVER DONE ANYTHING, STARTED TO DO ANYTHING, OR PREPARED TO DO ANYTHING TO END YOUR LIFE?: NO
2. HAVE YOU ACTUALLY HAD ANY THOUGHTS OF KILLING YOURSELF?: NO

## 2025-05-25 ASSESSMENT — PAIN - FUNCTIONAL ASSESSMENT: PAIN_FUNCTIONAL_ASSESSMENT: 0-10

## 2025-05-25 ASSESSMENT — PAIN SCALES - GENERAL
PAINLEVEL_OUTOF10: 0 - NO PAIN
PAINLEVEL_OUTOF10: 0 - NO PAIN

## 2025-05-25 NOTE — ED TRIAGE NOTES
Pt BIB POV from home w/ c/o HTN. Took it s/t not feeling well. Usually has low or WNL bp. Pt states she has been dealing w/ stress and frustration d/t not being permitted to go to a wedding. Pt is also on a new medication. Started Aptiom for seizures end of March this year. C/o lightheadedness. Denies CP/SOB. Ambulatory, speaking in full sentences. Oriented.

## 2025-05-26 LAB — HOLD SPECIMEN: NORMAL

## 2025-05-26 NOTE — ED PROVIDER NOTES
[facility]  ED  Encounter Note  Admit Date/RoomTime: 2025  6:07 PM  ED Room: [facility]  NAME: Odilia Oneal  : 1970  MRN: [id number]     Chief Complaint:  Anxiety and Hypertension    HISTORY OF PRESENT ILLNESS        Odilia Oneal is a 54 y.o. female who presents to the ED for evaluation of htn and maybe anxiety.    Pt stated that she felt her bp was high. Had some stress person in nature. Started Aptiom for seizures. No side effects. No chest pain sob back pain or leg pain. No hx of htn but is on indural for her anxiety. No fever or chills. No blood thinners or prior cards workup. Heart score low.  Nih 0. Pt stated that she has not been dizziness near syncope. No birth control.     ROS   Pertinent positives and negatives are stated within HPI, all other systems reviewed and are negative.    Past Medical History:  has a past medical history of Other conditions influencing health status (10/01/2013), Personal history of other diseases of the nervous system and sense organs, and Unspecified convulsions (Multi).    Surgical History:  has a past surgical history that includes Other surgical history (2021); Other surgical history (2021); Other surgical history (2021); Other surgical history (2022); MR angio head wo IV contrast (2020); and MR angio neck wo IV contrast (2020).    Social History:  reports that she has never smoked. She has never used smokeless tobacco. She reports that she does not drink alcohol and does not use drugs.    Family History: family history is not on file.     Allergies: Aspirin, Chlorhexidine, Chlorhexidine gluconate, Codeine, Doxycycline, Ibuprofen, Penicillins, Adhesive tape-silicones, Clindamycin, Latex, and Nickel    PHYSICAL EXAM   Oxygen Saturation Interpretation: Normal.       Physical Exam  Constitutional/General: Alert and oriented x3, well appearing, non toxic  HEENT:  NC/NT. PERRLA.  Airway patent.  Neck:  Supple, full ROM. No midline vertebral tenderness or crepitus.   Respiratory: Lung sounds clear to auscultation bilaterally. No wheezes, rhonchi or stridor. Not in respiratory distress.  CV:  Regular rate. Regular rhythm. No murmurs or rubs. 2+ distal pulses.  GI:  Abdomen soft, non-tender, non-distended. +BS. No rebound, guarding, or rigidity. No pulsatile masses.  Musculoskeletal: Moves all extremities x 4. Warm and well perfused. Capillary refill <3 seconds  Integument: Skin warm and dry. No rashes.   Neurologic: Alert and oriented with no focal deficits, symmetric strength 5/5 in the upper and lower extremities bilaterally.  Psychiatric: Normal affect.    Lab / Imaging Results   (All laboratory and radiology results have been personally reviewed by myself)  Labs:  Results for orders placed or performed during the hospital encounter of 05/25/25   CBC and Auto Differential    Collection Time: 05/25/25  6:19 PM   Result Value Ref Range    WBC 7.8 4.4 - 11.3 x10*3/uL    nRBC 0.0 0.0 - 0.0 /100 WBCs    RBC 4.47 4.00 - 5.20 x10*6/uL    Hemoglobin 13.2 12.0 - 16.0 g/dL    Hematocrit 40.9 36.0 - 46.0 %    MCV 92 80 - 100 fL    MCH 29.5 26.0 - 34.0 pg    MCHC 32.3 32.0 - 36.0 g/dL    RDW 13.7 11.5 - 14.5 %    Platelets 257 150 - 450 x10*3/uL    Neutrophils % 68.7 40.0 - 80.0 %    Immature Granulocytes %, Automated 0.4 0.0 - 0.9 %    Lymphocytes % 24.1 13.0 - 44.0 %    Monocytes % 5.5 2.0 - 10.0 %    Eosinophils % 1.2 0.0 - 6.0 %    Basophils % 0.1 0.0 - 2.0 %    Neutrophils Absolute 5.36 1.20 - 7.70 x10*3/uL    Immature Granulocytes Absolute, Automated 0.03 0.00 - 0.70 x10*3/uL    Lymphocytes Absolute 1.88 1.20 - 4.80 x10*3/uL    Monocytes Absolute 0.43 0.10 - 1.00 x10*3/uL    Eosinophils Absolute 0.09 0.00 - 0.70 x10*3/uL    Basophils Absolute 0.01 0.00 - 0.10 x10*3/uL   Magnesium    Collection Time: 05/25/25  6:19 PM   Result Value Ref Range    Magnesium 2.41 (H) 1.60 - 2.40 mg/dL   Comprehensive metabolic panel     Collection Time: 05/25/25  6:19 PM   Result Value Ref Range    Glucose 104 (H) 74 - 99 mg/dL    Sodium 142 136 - 145 mmol/L    Potassium 4.4 3.5 - 5.3 mmol/L    Chloride 109 (H) 98 - 107 mmol/L    Bicarbonate 25 21 - 32 mmol/L    Anion Gap 12 10 - 20 mmol/L    Urea Nitrogen 19 6 - 23 mg/dL    Creatinine 0.96 0.50 - 1.05 mg/dL    eGFR 70 >60 mL/min/1.73m*2    Calcium 9.1 8.6 - 10.3 mg/dL    Albumin 4.3 3.4 - 5.0 g/dL    Alkaline Phosphatase 127 (H) 33 - 110 U/L    Total Protein 7.0 6.4 - 8.2 g/dL    AST 18 9 - 39 U/L    Bilirubin, Total 0.3 0.0 - 1.2 mg/dL    ALT 17 7 - 45 U/L   Lactate    Collection Time: 05/25/25  6:19 PM   Result Value Ref Range    Lactate 0.7 0.4 - 2.0 mmol/L   Protime-INR    Collection Time: 05/25/25  6:19 PM   Result Value Ref Range    Protime 10.8 9.8 - 12.4 seconds    INR 1.0 0.9 - 1.1   aPTT    Collection Time: 05/25/25  6:19 PM   Result Value Ref Range    aPTT 35 26 - 36 seconds   B-Type Natriuretic Peptide    Collection Time: 05/25/25  6:19 PM   Result Value Ref Range    BNP 17 0 - 99 pg/mL   Troponin I, High Sensitivity, Initial    Collection Time: 05/25/25  6:19 PM   Result Value Ref Range    Troponin I, High Sensitivity 3 0 - 13 ng/L   Urinalysis with Reflex Culture and Microscopic    Collection Time: 05/25/25  6:42 PM   Result Value Ref Range    Color, Urine Yellow Light-Yellow, Yellow, Dark-Yellow    Appearance, Urine Clear Clear    Specific Gravity, Urine 1.033 1.005 - 1.035    pH, Urine 5.0 5.0, 5.5, 6.0, 6.5, 7.0, 7.5, 8.0    Protein, Urine NEGATIVE NEGATIVE, 10 (TRACE), 20 (TRACE) mg/dL    Glucose, Urine Normal Normal mg/dL    Blood, Urine NEGATIVE NEGATIVE mg/dL    Ketones, Urine NEGATIVE NEGATIVE mg/dL    Bilirubin, Urine NEGATIVE NEGATIVE mg/dL    Urobilinogen, Urine 2 (1+) (A) Normal mg/dL    Nitrite, Urine NEGATIVE NEGATIVE    Leukocyte Esterase, Urine 250 Sabina/uL (A) NEGATIVE   Extra Urine Gray Tube    Collection Time: 05/25/25  6:42 PM   Result Value Ref Range    Extra Tube  Hold for add-ons.    Microscopic Only, Urine    Collection Time: 05/25/25  6:42 PM   Result Value Ref Range    WBC, Urine 1-5 1-5, NONE /HPF    RBC, Urine 1-2 NONE, 1-2, 3-5 /HPF    Squamous Epithelial Cells, Urine 1-9 (SPARSE) Reference range not established. /HPF    Mucus, Urine 2+ Reference range not established. /LPF   Troponin, High Sensitivity, 1 Hour    Collection Time: 05/25/25  7:26 PM   Result Value Ref Range    Troponin I, High Sensitivity <3 0 - 13 ng/L     Imaging:  All Radiology results interpreted by Radiologist unless otherwise noted.  XR chest 2 views   Final Result   No radiographic evidence of acute cardiopulmonary disease.   Signed by Jose Beebe MD          ED Course / Medical Decision Making   Medications - No data to display  ED Course as of 05/26/25 1446   Sun May 25, 2025   1814 EKG rate:  79 bpm pr interval 136 ms qrs duration 84 ms qt qtc 390/447 ms prt axes 31 16 40 nsr normal axis rate and rhythm, personally reviewed by me [PK]      ED Course User Index  [PK] DANIELLA Lilly-CNP         Diagnoses as of 05/26/25 1446   Primary hypertension     Re-examination:    Patient’s condition stable        MDM:       Odilia Oneal is a 54 y.o. female who presents to the ED for evaluation of htn and maybe anxiety.    Pt stated that she felt her bp was high. Had some stress person in nature. Started Aptiom for seizures. No side effects. No chest pain sob back pain or leg pain. No hx of htn but is on indural for her anxiety. No fever or chills. No blood thinners or prior cards workup. Heart score low.  Nih 0. Pt stated that she has not been dizziness near syncope. No birth control.    ED course stable  Pt monitored in ED for 3 hours. BP around 140 to 150 systolic. Felt good. Denied chest pain sob. Workup stable, EKG showed no signs of ischemia. Stress reduction strategies discussed. Keep active and exercise. See PCP in followup. May need to increase indural for anxiety to tid or qid.  Return if  needed. Call PCP in am and take bp once a week at different times of the days to help pcp acertain when bp is most high usually at night.   Ddx: htn   Plan of Care/Counseling:  I reviewed today's visit with the patient in addition to providing specific details for the plan of care and counseling regarding the diagnosis and prognosis.  Questions are answered at this time and are agreeable with the plan.    ASSESSMENT     1. Primary hypertension      PLAN   Home Advised to return for signs of head injury, weakness, numbness or tingling to extremities, incontinence and Advised to return for worsening or additional problems such as abdominal or chest pain  Diagnostic tests were reviewed and questions answered. Diagnosis, care plan and treatment options were discussed. The patient understand instructions and will follow up as directed.  Condition stable  The patient was given verbal follow-up instructions  Patient condition is stable    New Medications   No orders of the defined types were placed in this encounter.    Electronically signed by GUSTAVO Lilly     **This report was transcribed using voice recognition software. Every effort was made to ensure accuracy; however, inadvertent computerized transcription errors may be present.  END OF ED PROVIDER NOTE     GUSTAVO Lilly  05/26/25 0205

## 2025-05-27 ENCOUNTER — TELEPHONE (OUTPATIENT)
Dept: PRIMARY CARE | Facility: CLINIC | Age: 55
End: 2025-05-27
Payer: MEDICARE

## 2025-05-27 DIAGNOSIS — J45.909 ASTHMA, UNSPECIFIED ASTHMA SEVERITY, UNSPECIFIED WHETHER COMPLICATED, UNSPECIFIED WHETHER PERSISTENT (HHS-HCC): ICD-10-CM

## 2025-05-27 DIAGNOSIS — G47.9 SLEEP DIFFICULTIES: ICD-10-CM

## 2025-05-27 LAB — BACTERIA UR CULT: NORMAL

## 2025-05-28 PROBLEM — J45.909 ASTHMA: Status: ACTIVE | Noted: 2025-04-17

## 2025-05-31 DIAGNOSIS — E55.9 VITAMIN D DEFICIENCY: ICD-10-CM

## 2025-05-31 DIAGNOSIS — E03.9 HYPOTHYROIDISM, UNSPECIFIED TYPE: ICD-10-CM

## 2025-06-01 LAB
ATRIAL RATE: 79 BPM
P AXIS: 31 DEGREES
P OFFSET: 208 MS
P ONSET: 150 MS
PR INTERVAL: 136 MS
Q ONSET: 218 MS
QRS COUNT: 13 BEATS
QRS DURATION: 84 MS
QT INTERVAL: 390 MS
QTC CALCULATION(BAZETT): 447 MS
QTC FREDERICIA: 427 MS
R AXIS: 16 DEGREES
T AXIS: 40 DEGREES
T OFFSET: 413 MS
VENTRICULAR RATE: 79 BPM

## 2025-06-02 RX ORDER — ERGOCALCIFEROL 1.25 MG/1
CAPSULE ORAL
Qty: 12 CAPSULE | Refills: 0 | Status: SHIPPED | OUTPATIENT
Start: 2025-06-08

## 2025-06-02 RX ORDER — LEVOTHYROXINE SODIUM 50 UG/1
50 TABLET ORAL DAILY
Qty: 30 TABLET | Refills: 2 | Status: SHIPPED | OUTPATIENT
Start: 2025-06-02 | End: 2026-06-02

## 2025-07-03 DIAGNOSIS — G47.9 SLEEP DIFFICULTIES: Primary | ICD-10-CM

## 2025-07-03 RX ORDER — TRAZODONE HYDROCHLORIDE 50 MG/1
50 TABLET ORAL NIGHTLY
Qty: 90 TABLET | Refills: 1 | Status: SHIPPED | OUTPATIENT
Start: 2025-07-03 | End: 2025-08-02

## 2025-07-07 ENCOUNTER — TELEPHONE (OUTPATIENT)
Dept: PRIMARY CARE | Facility: CLINIC | Age: 55
End: 2025-07-07
Payer: MEDICARE

## 2025-07-07 NOTE — LETTER
July 14, 2025     Odilia Oneal  [address] [place] [address] [address] [place] [id number]    Patient: Odilia Oneal   YOB: 1970   Date of Visit: 7/7/2025           To Whom It May Concern,     Odilia Oneal has been under my care. She is unable to provide her own transportation due to being disabled requires the use of the bus to get to her appointments. Please contact the office with any questions or concerns           Sincerely,         DANIELLA Nicholson-CNP

## 2025-07-08 ENCOUNTER — TELEPHONE (OUTPATIENT)
Dept: PRIMARY CARE | Facility: CLINIC | Age: 55
End: 2025-07-08
Payer: MEDICARE

## 2025-07-09 ENCOUNTER — OFFICE VISIT (OUTPATIENT)
Dept: SLEEP MEDICINE | Facility: CLINIC | Age: 55
End: 2025-07-09
Payer: MEDICARE

## 2025-07-09 VITALS
HEIGHT: 61 IN | BODY MASS INDEX: 52.11 KG/M2 | HEART RATE: 70 BPM | SYSTOLIC BLOOD PRESSURE: 112 MMHG | DIASTOLIC BLOOD PRESSURE: 76 MMHG | OXYGEN SATURATION: 96 % | TEMPERATURE: 97.9 F | WEIGHT: 276 LBS

## 2025-07-09 DIAGNOSIS — G47.33 OBSTRUCTIVE SLEEP APNEA: Primary | ICD-10-CM

## 2025-07-09 DIAGNOSIS — G47.9 SLEEP DIFFICULTIES: ICD-10-CM

## 2025-07-09 DIAGNOSIS — F51.01 PRIMARY INSOMNIA: ICD-10-CM

## 2025-07-09 PROCEDURE — 1036F TOBACCO NON-USER: CPT | Performed by: NURSE PRACTITIONER

## 2025-07-09 PROCEDURE — 3008F BODY MASS INDEX DOCD: CPT | Performed by: NURSE PRACTITIONER

## 2025-07-09 PROCEDURE — 99205 OFFICE O/P NEW HI 60 MIN: CPT | Performed by: NURSE PRACTITIONER

## 2025-07-09 PROCEDURE — G2211 COMPLEX E/M VISIT ADD ON: HCPCS | Performed by: NURSE PRACTITIONER

## 2025-07-09 NOTE — ASSESSMENT & PLAN NOTE
Remote diagnosis. No record of testing available today. Chart notes reviewed from Dr. Carrasco stating mild sleep apnea and recommendation for PAP titration  -Will request from [facility]  -Rx for new PAP machine via [facility] as hers is recalled model. Recall via [employer] is now closed. States she called the phone number provided at the time and was told her machine was not recalled. Likely gave the SN for the humidifier not PAP machine  -Fitted for View3 FFM. Reports this was more comfortable for her to wear than Simplus. She is willing to try. Small frame with small cushion. May need F30i however with small cushion as it was slightly away from her nose with smallest sizing  -Recommended to consider nasal mask with chin strap for minimizing equipment. She was not interested  -Adjusted to 7-9 APAP on current device for a trial with goal of reducing headaches.   -Follow up in 6 months

## 2025-07-09 NOTE — PATIENT INSTRUCTIONS
"  EFFECTIVE IMMEDIATELY:  If you have a Alberto Respironics PAP device, discontinue use if your device was manufactured prior to April 26, 2021 due to recall issued on June 14, 2021 from [employer] due to concern for potential risk for health issues related to foam used in specific Alberto PAP devices.    For more information, contact your DME for next steps and to verify the age of your device. If your device is more than 5 years old, you may be eligible for an updated device per insurance. You can also visit [employer]/SRC-update or call [phone].    I identified that my patient is using a device under the Alberto recall and advised them to register their device <<[email or web address].[employer]/>> and call [employer] for additional information if needed (Alberto number, [phone]). I advised that the company has not given a clear timeline for when patients will receive their machine.  Thus, I offered a prescription for a replacement machine but also recognize that insurance and resources may limit the ability to obtain a new device at this time.  The risk and benefits of continuations of using the current PAP therapy as well as treatment options were discussed with the patient, taking into consideration comorbidities, severity of symptoms, risks associated with PAP discontinuation, and safety-sensitive roles. The risk of the potential particulate exposure symptoms listed by the company were discussed, which includes respiratory issues, and possible toxic and carcinogenic effects. [employer] reports that the foam-related complaint rate in 2020 was low (0.03%)  and that \"The potential risks of chemical exposure due to off-gassing include headache, irritation, hypersensitivity, nausea/vomiting, and possible toxic and carcinogenic effects.\" [employer] reports that it \"has received no reports regarding patient impact related to chemical emissions\"       [facility]  [address] " [facility]  [address] [facility] [address]       NAME: Odilia Oneal   DATE:  07/09/25    DIAGNOSIS:   1. Sleep difficulties  Referral to Adult Sleep Medicine          Thank you for coming to the Sleep Medicine Clinic today! Your sleep medicine provider today was: GUSTAVO Waggoner Below is a summary of your treatment plan, other important information, and our contact numbers:    TREATMENT PLAN:   - Follow-up in 6 months.  - If not already done, sign up for 'My Chart' and send prescription requests or messages through this    Obstructive sleep apnea (YARI): YARI is a sleep disorder where your upper airway muscles relax during sleep and the airway intermittently and repetitively narrows and collapses leading to blocked airway (apnea) which, in turn, can disrupt breathing in sleep, lower oxygen levels while you sleep and cause night time wakings. Because apnea may cause higher carbon dioxide or low oxygen levels, untreated YARI can lead to heart arrhythmia, elevation of blood pressure, and make it harder for the body to consolidate memory and metabolize (leading to higher blood sugars at night).   Frequent partial arousals occur during sleep resulting in sleep deprivation and daytime sleepiness. YARI is associated with an increased risk of cardiovascular disease, stroke, hypertension, and insulin resistance. Moreover, untreated YARI with excessive daytime sleepiness can increase the risk of motor vehicular accidents.    Some conservative strategies for YARI are:   Positional therapy - Avoid sleeping on your back.   Healthy diet, exercise, and optimizing weight encouraged.   Avoid alcohol late in the evening as it can make sleep apnea worse.     Safety: Avoid driving and operating heavy equipment while sleepy. Drowsy driving may lead to life-threatening motor vehicle accidents.     Common treatment options for sleep apnea include weight management, positional therapy, Positive Airway  "Therapy (PAP) therapy, oral appliance therapy, hypoglossal nerve stimulation, and select airway surgeries.     Starting Positive Airway Pressure: You were ordered a device to wear when you sleep called PAP (Positive Airway Pressure) to treat your sleep apnea. The order will be submitted to a durable medical equipment company who will arrange setting you up with the device. They will provide all the necessary equipment and discuss use and maintenance of the device with you.     Please followup with us in 1-2 months of starting PAP to see how well it is working for you or to troubleshoot. Please bring your equipment to this initial followup visit.    **Please bring all PAP equipment with you to follow up appointments unless told otherwise.**     Important things to keep in mind as you start PAP:  Insurance will monitor your usage during the first 90 days.  You should use your PAP - \"all night, every night\", for your health. The bare minimum is to use your PAP device while sleeping = at least 4 hours per day at least 5 days per week. Otherwise, your PAP device may be reclaimed by your PAP vendor at 90 days.  There are many mask to choose from to wear with your PAP machine. If you are not comfortable with the first mask issued to you, call your DME and ask for another option to try. Some have a 30 day return policy.  Discuss with your provider if you are having issues breathing with the machine or the temperature or humidity feel uncomfortable  Expect to have an adjustment period when you start your device. It helps to continuing wearing the machine every day for a period of time until you get more used to it. You can practice with wearing the mask alone if you need, then add in the PAP air pressure a few days later.   Reach out for help if you are struggling! The sleep medicine department can be reached at [phone]  We encourage you to download data monitoring apps to your phone. For [facility] 10/11 - MyAir " alba. For [employer] - DreamMapper. Both are available in the [employer] store for free and are a great tool to monitor your progress with your CPAP night to night.           Annual Reminders About Your Sleep Apnea    Your sleep apnea is well controlled based on reviewing your PAP Data Report.     General Reminders:  Continue current machine settings. Continue using machine every night. Need at least 4 hours daily usage.   Remember to clean your mask, tubings, and water chamber regularly as instructed.  Know the replacement schedule of your supplies/ accessories and contact your DME (durable medical equipment) provider if you are due for them.  Avoid driving or operating heavy machinery when drowsy. A person driving while sleepy is 5 times more likely to have an accident. If you feel sleepy, pull over and take a short power nap (sleep for less than 30 minutes). Otherwise, ask somebody to drive you.    Follow-up sooner through [employer] or calling our office if you have any of the following symptoms:  Snoring or stopping breathing while using the machine  Recurrence of fatigue, sleepiness, insomnia, and other symptoms you had prior using machine  Persistent or worsening fatigue or sleepiness despite regular use of machine  Issues tolerating the machine like bloating sensation, air hunger, too much hot air, too much pressure, taking off mask without recall in the middle of sleep, etc.     Other conservative measures to improve sleep apnea:  Losing weight can lead to some improvement of YARI which means you will need lower pressures in machine to control your YARI. In some patients, they don't need to use the machine after bariatric surgery. Hence, consider medical and/or surgical weight loss especially if your BMI is more than 35.  Avoiding alcohol, sedative-hypnotics, and sedating medications is imperative as these substances can worsen snoring and sleep apnea  If you have nasal congestion or seasonal allergies,  improving your breathing through the nose is critical for treating sleep apnea, tolerating CPAP, and improving your sleep; hence, using intranasal steroid spray like Flonase might help. Make sure you know the proper way to use it.  Stay off your back when sleeping.    Common issues with PAP machine:  Mask gets dislodged when turning to the side: Consider getting a CPAP pillow or switching to a mask with hose on top.     Dry mouth:  Your machine has built-in humidifier that heats up the air to prevent dry mouth. It can be adjusted to your comfort. You can try that first and increase setting one level one night at a time to check which setting is comfortable and effective in lessening dry mouth. If dry mouth persists despite humidity setting adjustment, may apply OTC Biotene gel over the gums at bedtime.  If Biotene gel is not effective, consider trying XEROSTOM gel from [employer].  Also, eliminate or reduce dose of meds that can cause dry mouth if possible. Lastly, may try getting a separate room humidifier machine.    Airleaks: Please call DME as they may need to adjust your mask or refit you with a different kind of mask. In addition, you can ask DME for tips on getting a good mask seal and mask fit.     Difficulty tolerating the mask: Contact your DME to try a different kind of mask and/or call office to get a referral to Sleep Psychologist for CPAP desensitization. CPAP desensitization technique is a set of strategies that helps patient cope with claustrophobia and anxiety related to wearing mask. Alternatively, we can do a daytime mini-sleep study called PAP-nap trial wherein you will try on different kinds of mask and the sleep technician will try different pressure settings on CPAP and BPAP machines to see which specific pressure is tolerable and comfortable for you.     Water droplets or moisture within the hose and/or mask: This is called rain-out and it is caused by condensation of too much heated humidity  "on the cooler walls of the hose. If you have rain-out, turn down humidity settings or get a heated hose. If you already have a heated hose, turn up the \"tube temperature\" of the heated hose. Alternatively, if you don't want to get a heated hose or warmer air, may wrap the CPAP hose with stockings to keep it somewhat warm. Also, you need to place the machine on the floor and lower the hose so that water won't travel upward towards your mask.     You can also go to the following EDUCATION WEBSITES for further information:   American Academy of Sleep Medicine http://sleepeducation.org  National Sleep Foundation: https://sleepfoundation.org  American Sleep Apnea Association: https://www.sleepapnea.org (for patients with sleep apnea)    Here at [facility], we wish you a restful sleep!     Instructions - Common YARI Recs: - For your sleep apnea, continue to use your PAP every night and use it whenever you are sleeping.   - Avoid alcohol or sedatives several hours prior to sleeping.   - Get additional supplies for your PAP (e.g., mask, hose, filters) every 3 months or as your insurance allows from your [employer] company. Replacement cushions for your PAP mask can be requested monthly if airseals are an issue.  - Remember to clean your mask, tubings, and water chamber regularly as instructed.  - Avoid driving or operating heavy machinery when drowsy. A person driving while sleepy is five (5) times more likely to have an accident. If you feel sleepy, pull over and take a short power nap (sleep for less than 30 minutes). Otherwise, ask somebody to drive you.    EASY WAYS TO IMPROVE YOUR SLEEP:  1. Go to bed and wake up at the same time every day.   Aim for 8 hours but some people need less, some need more.   Get out of bed if you are not sleeping.   Limit naps to 20 min or less.   2. Expose yourself to daylight and/ or bright light in the morning.   Go outside or spend time near a window each morning.   You can use a light " box (found on Amazon) if you wake before the sunrise.   Limit light exposure in the evenings (including electronic usage).   Try meditation, reading, stretching, deep breathing, warm shower or bath, or yoga nidra as part of your bedtime routine. There are many great FREE, videos or audio tracks on "[employer], etc for guidance.  3. Exercise, in some form, EVERY day, but not too close to bedtime. Consider making this part of your routine at the start of your day, followed by a cool shower.  4. Eat meals at roughly the same time every day. Make sure you are prioritizing fruits, vegetables, whole grains, lean proteins.  5. Time your caffeine intake. Make sure you are not drinking caffeine within 8 to 12 hours prior to your bedtime.   6. Avoid marijuana, alcohol, and nicotine. They will reduce sleep quality in any quantity.  7. Learn to manage anxiety. Psychology services at  can be reached at [phone] to schedule an appointment.     IMPORTANT INFORMATION:     Call 1 for medical emergencies.  Our offices are generally open from Monday-Friday, 9 am - 5 pm.  If you need to get in touch with me, you may either call me and my team(number is below) or you can use [employer].  If a referral for a test, for CPAP, or for another specialist was made, and you have not heard about scheduling this within a week, please call scheduling at [phone] ([phone]).  If you are unable to make your appointment for clinic or an overnight study, kindly call the office at least 48 hours in advance to cancel and reschedule.  If you are on CPAP, please bring your device's card to each clinic appointment unless told otherwise by your provider.  There are no supporting services by either the sleep doctors or their staff on weekends and Holidays, or after 5 PM on weekdays.   If you have been asked to come to a sleep study, make sure you bring toiletries, a comfy pillow, and any nighttime medications that you may regularly take. Also be  sure to eat dinner before you arrive. We generally do not provide meals.      PRESCRIPTIONS:  We require 7 days advanced notice for prescription refills. If we do not receive the request in this time, we cannot guarantee that your medication will be refilled in time. Please contact the sleep nurses listed below for refills or request via [employer].     IMPORTANT PHONE NUMBERS:   Sleep Medicine Clinic Fax: [phone]  Appointments (for Pediatric Sleep Clinic): [phone] ([phone] - option 1  Appointments (for Adult Sleep Clinic): [phone] ([phone] - option 2  Appointments (For Sleep Studies): [phone] ([phone] - option 3  Behavioral Sleep Medicine: [phone]  Sleep Surgery: [phone]  ENT (Otolaryngology): [phone]  Headache Clinic (Neurology): [phone]  Neurology: [phone]  Psychiatry: [phone]  Pulmonary Function Testing (PFT) [place]: [phone]  Pulmonary Medicine: [phone]  [facility] (DME): [phone]  [employer] (DME): [phone]  [facility] (DME): [phone]    Our Adult Sleep Medicine Team (Please do not hesitate to call the office or sleep nurse with any questions between appointments):    Adult Sleep Nurses (Nakia Orellana, RN and Rose Doe RN):  For clinical questions and refilling prescriptions: [phone]  Email sleep diaries and other documents at: [email or web address]    Adult Sleep Medicine Secretaries:  Maxine Orozco (For [place]/Yeh): P: [phone]  F: [phone]  Garrison Gonsalez ([place])Office: [phone] option 4 Office Fax: [phone]  Maria Dolores Hawk (For [place]): P: [phone]  Fax: [phone]  Shabnam Currie (For Jurcevic/Blank): P: [phone]  F: [phone]  Medina Johnson (For [place]): P: [phone]  F: [phone]  Megan Whitfield (For Anju/Jose/Pat): P: [phone]  F: [phone]  Raquel Engel (For Frederic/Mike): P: [phone]  F: [phone]  "    Adult Sleep Medicine Advanced Practice Providers:  Manolo Lee ([place], [place])  Arianne Garcia ([place], [facility])  Melanie Mayo CNP ([place], [place], [place])  Janelle Cordero CNP ([place], [place], [place])  Jenni Lennon ([place], [place], [place])  Yovanny Mckeon CNP ([place], [place])      Our Sleep Testing Center (STC) Locations:  Our team will contact you to schedule your sleep study, however, you can contact us as follow:  Main Phone Line (scheduling only): [phone] ([phone]), option 3  Adult and Pediatric Locations  [facility] (6 years and older): [facility] - 4th floor ([address]) After hours line: [phone]  [facility] (Main campus: All ages): [facility], 6th floor. After hours line: [phone]   [place] (5 years and older; younger considered on case-by-case basis): [address]; [address], [address]. Scheduling  After hours line: [phone]   [place] (6 years and older): [address]; [address]; [address]   [place] (6 years and older): [address] [facility] [address] [facility], [address]  After hours line: [phone]   [place] (13 years and older) in [place]: [address], 2nd floor  After hours line: [phone]   [place] (13 year and older): [address]  After hours line: [phone] (Home studies out of [facility])    Adult Only Locations:   [place] (18 years and older): [address] [facility], 2nd floor   [place] (18 years and older): [address] [facility]; 4th floor  After hours line: [phone]  [facility] (18 years and older) at [place]: [address] [facility]  After hours line: [phone]        CONTACTING YOUR SLEEP MEDICINE PROVIDER:  Send a message directly to your provider through \"My Chart\", which is the email service through your  Records Account: [email or web address]   Call [phone] " "and leave a message. One of the administrative assistants will forward the message to your sleep medicine provider through \"My Chart\" and/or email.     Your sleep medicine provider for this visit was: GUSTAVO Waggoner    In the event that you are running more than 15 minutes late to your appointment, I will kindly ask you to reschedule.       "

## 2025-07-09 NOTE — PROGRESS NOTES
"    Patient: Odilia Oneal    [id number]  : 1970 -- AGE 54 y.o.    Provider: DANIELLA Waggoner-CNP     Location [facility]   Service Date: 2025              [facility]  New Visit Note    HISTORY OF PRESENT ILLNESS     The patient's referring provider is: Chely Booker APRN*    HISTORY OF PRESENT ILLNESS   Odilia Oneal is a 54 y.o. female who presents to a [facility] for a sleep medicine evaluation with concerns of sleep difficulties. She is disabled.     The patient has h/o cardiomegaly, hyperlipidemia, tinnitus, hearing loss, obesity, depression, arthritis, degenerative disc disease, epilepsy, asthma, insomnia, delayed sleep phase.     Past Sleep History  Patient has had a sleep study via [facility] showing \"mild sleep apnea\" per scanned chart notes from Dr. Carrasco. Was recommended PAP titration and trial of Dayvigo, CBT-I, weight loss.     [facility] [employer]  [address] [place], [place] [id number]   2020    Current History    On today's visit, the patient reports difficulty tolerating CPAP mask. States she was set up during COVID. Thinks through [facility]. States they came out to her house and showed her how to use the CPAP but did not fit her for a mask. Told her she could not get a new style until she used the equipment for 30 days. Has Simplus FFM. States the forehead bar bothers her head, gives her headaches when she wakes up.   Called [facility] and was told her machine was not recalled. Has been using original DS1.   States she always feels tired. Does not sleep well. Falls asleep whenever and wakes up whenever. Takes trazodone per psychiatry for sleep. States she was without it for a period of time and did not sleep at all. Tried melatonin without benefit. Notes she has a hard time cleaning her equipment.     Sleep schedule  on weekdays / work " "days:  Usual Bedtime:    varies  Falls asleep around:    # of awakenings:   Wake time:  varies    Naps: varies    Preferred sleeping position: back, side     Sleep-related ROS:    Problems going to sleep: No problems going to sleep    Problems staying asleep Problems Staying Asleep: nocturia, breathing problems, and no clear reason    Breathing during sleep: night sweats    Daytime Symptoms  On awakening patient reports: wake unrefreshed, feels sleepy, morning headaches, morning dry mouth, and morning sore throat    RLS screen:  RLSSCREEN: - Sensations: Patient does not have unusual sensations in their extremities that cause an urge to move them     Sleep-related behaviors:   dreams upon falling asleep    Fatigue: difficulty staying focused, difficulty with irritability    ESS: No data recorded   NOELLE:    FOSQ:  -      REVIEW OF SYSTEMS     REVIEW OF SYSTEMS  Review of Systems   All other systems reviewed and are negative.    ALLERGIES AND MEDICATIONS     ALLERGIES  Allergies[1]    MEDICATIONS  Current Medications[2]      PAST HISTORY     PAST MEDICAL HISTORY  Medical History[3]    PAST SURGICAL HISTORY:  Surgical History[4]    FAMILY HISTORY  Family History[5]      SOCIAL HISTORY  She  reports that she has never smoked. She has never used smokeless tobacco. She reports that she does not drink alcohol and does not use drugs. She currently lives alone.     Caffeine consumption: soda   Alcohol consumption: none  Smoking: none  Marijuana: none    PHYSICAL EXAM     VITAL SIGNS: /76   Pulse 70   Temp 36.6 °C (97.9 °F)   Ht (!) 1.549 m (5' 1\")   Wt 125 kg (276 lb)   SpO2 96%   BMI 52.15 kg/m²      PREVIOUS WEIGHTS:  Wt Readings from Last 3 Encounters:   07/09/25 125 kg (276 lb)   05/25/25 116 kg (255 lb)   04/22/25 129 kg (284 lb 3.2 oz)       Physical Exam  Physical Exam   Constitutional: Alert and oriented, cooperative, no obvious distress   HEENT: Non icteric or anemic, EOM WNL bilaterally   Neck: Supple, no " "JVD, no goiter, no adenopathy, no rigidity  Chest: CTA bilaterally, no wheezing, crackles, rubs   Cardiac: RRR, S1 and S2, no murmur, rub, thrill   Abdomen: Obese, Soft, nontender, no masses, no organomegaly   Extremities: No clubbing, no LL edema   Neuromuscular: Cranial nerves grossly intact, no focal deficits    RESULTS/DATA     CARBON DIOXIDE (mmol/L)   Date Value   01/29/2025 23     Bicarbonate   Date Value   05/25/2025 25 mmol/L   01/15/2024 22 mmol/L (L)   06/02/2023 21 MMOL/L (L)   05/22/2023 21 MMOL/L (L)   09/21/2022 23 MMOL/L (L)     Iron (UG/DL)   Date Value   09/28/2021 61   07/29/2021 55   12/10/2020 38     Iron Saturation (%)   Date Value   09/28/2021 16.9   07/29/2021 14.6   12/10/2020 8.8 (L)     TIBC (UG/DL)   Date Value   09/28/2021 361   07/29/2021 378   12/10/2020 432 (H)     Ferritin (NG/ML)   Date Value   12/10/2020 9 (L)   08/28/2018 12 (L)       Pulmonary Functions Testing Results:    No results found for: \"FEV1\", \"FVC\", \"VXV5TAJ\", \"TLC\", \"DLCO\"    Echo:  Results for orders placed in visit on 09/28/21    ECHOCARDIOGRAM    Narrative  Ordered by an unspecified provider.      Failed to redirect to the Timeline version of the [employer] SmartLink.      PAP Adherence:        ASSESSMENT/PLAN     Ms. Oneal is a 54 y.o. female and She was referred to the [facility] for evaluation of YARI    Problem List and Orders  Problem List Items Addressed This Visit           ICD-10-CM    Primary insomnia F51.01    Reports difficulty falling asleep and staying asleep. Reports variable sleep pattern, unable to state approximate sleep time. States she gets about 5 hours per night  Takes trazodone per psychiatry with some benefit  Will consider sleep logging at next visit to get a sense of her patterns  Treatment of YARI may improve          Obstructive sleep apnea - Primary G47.33    Remote diagnosis. No record of testing available today. Chart notes reviewed from Dr. Carrasco stating mild " sleep apnea and recommendation for PAP titration  -Will request from [facility]  -Rx for new PAP machine via [facility] as hers is recalled model. Recall via [employer] is now closed. States she called the phone number provided at the time and was told her machine was not recalled. Likely gave the SN for the humidifier not PAP machine  -Fitted for [employer] FFM. Reports this was more comfortable for her to wear than Simplus. She is willing to try. Small frame with small cushion. May need F30i however with small cushion as it was slightly away from her nose with smallest sizing  -Recommended to consider nasal mask with chin strap for minimizing equipment. She was not interested  -Adjusted to 7-9 APAP on current device for a trial with goal of reducing headaches.   -Follow up in 6 months         Relevant Orders    Positive Airway Pressure (PAP) Therapy    BMI 50.0-59.9, adult (Multi) Z68.43    Weight loss recommended  Follow up with PCP for recommendations            Other Visit Diagnoses         Codes      Sleep difficulties     G47.9                      [1]   Allergies  Allergen Reactions    Aspirin Nausea/vomiting    Chlorhexidine Unknown     Does not recall exact allergy.     Chlorhexidine Gluconate Other    Codeine Other and Nausea/vomiting    Doxycycline Unknown    Ibuprofen Other and Nausea/vomiting    Penicillins Nausea/vomiting    Adhesive Tape-Silicones Rash and Other    Clindamycin Itching    Latex Other and Rash    Nickel Rash   [2]   Current Outpatient Medications   Medication Sig Dispense Refill    albuterol 2.5 mg /3 mL (0.083 %) nebulizer solution Take 3 mL (2.5 mg) by nebulization every 6 hours. 75 mL 1    albuterol 90 mcg/actuation inhaler Inhale 2 puffs every 4 hours.      Aptiom 400 mg tablet TAKE 1 TABLET BY MOUTH ONCE DAILY. START ONCE YOU COMPLETE 1 WEEK  MG.      Breo Ellipta 100-25 mcg/dose inhaler Inhale 1 puff once daily.      dicyclomine (Bentyl) 10 mg capsule Take 1  capsule (10 mg) by mouth 4 times a day. (Patient taking differently: Take 1 capsule (10 mg) by mouth 2 times a day.)      ergocalciferol (Vitamin D-2) 1250 mcg (50,000 units) capsule Take 1 capsule by mouth once a week 12 capsule 0    lamoTRIgine (LaMICtal) 200 mg tablet Take 1 tablet (200 mg) by mouth 2 times a day. 180 tablet 3    levothyroxine (Synthroid, Levoxyl) 50 mcg tablet Take 1 tablet (50 mcg) by mouth early in the morning.. Take on an empty stomach at the same time each day, either 30 to 60 minutes prior to breakfast 30 tablet 2    meclizine (Antivert) 25 mg tablet Take 1 tablet (25 mg) by mouth 3 times a day as needed.      propranolol (Inderal) 20 mg tablet Take 1 tablet (20 mg) by mouth 4 times a day.      simvastatin (Zocor) 20 mg tablet Take 1 tablet (20 mg) by mouth once daily in the evening. 90 tablet 0    traZODone (Desyrel) 50 mg tablet Take 1 tablet (50 mg) by mouth once daily at bedtime. 90 tablet 1    Trokendi  mg capsule,extended release 24hr Take 2 capsules by mouth 2 times a day. 360 capsule 3    venlafaxine XR (Effexor-XR) 150 mg 24 hr capsule Take 1 capsule (150 mg) by mouth once daily.      fluticasone (Flonase) 50 mcg/actuation nasal spray Administer 1 spray into each nostril once daily. Shake gently. Before first use, prime pump. After use, clean tip and replace cap. 16 g 5    pantoprazole (ProtoNix) 20 mg EC tablet Take 1 tablet (20 mg) by mouth once daily.      verapamil (Calan) 40 mg tablet Take 1 tablet (40 mg) by mouth once daily. (Patient not taking: Reported on 7/9/2025)       No current facility-administered medications for this visit.   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    Personal history of other diseases of the nervous system and sense organs     History of obstructive sleep apnea    Unspecified convulsions (Multi)     Convulsions   [4]   Past Surgical  History:  Procedure Laterality Date    MR HEAD ANGIO WO IV CONTRAST  6/23/2020    MR HEAD ANGIO WO IV CONTRAST 6/23/2020 [facility]    MR NECK ANGIO WO IV CONTRAST  6/23/2020    MR NECK ANGIO WO IV CONTRAST 6/23/2020 [facility]    OTHER SURGICAL HISTORY  02/18/2021    Tonsillectomy    OTHER SURGICAL HISTORY  02/18/2021    Shoulder surgery    OTHER SURGICAL HISTORY  02/18/2021    Back surgery    OTHER SURGICAL HISTORY  09/29/2022    Knee surgery   [5] No family history on file.

## 2025-07-09 NOTE — ASSESSMENT & PLAN NOTE
Reports difficulty falling asleep and staying asleep. Reports variable sleep pattern, unable to state approximate sleep time. States she gets about 5 hours per night  Takes trazodone per psychiatry with some benefit  Will consider sleep logging at next visit to get a sense of her patterns  Treatment of YARI may improve

## 2025-07-10 ENCOUNTER — TELEPHONE (OUTPATIENT)
Dept: SLEEP MEDICINE | Facility: HOSPITAL | Age: 55
End: 2025-07-10
Payer: MEDICARE

## 2025-07-10 NOTE — TELEPHONE ENCOUNTER
----- Message from Melanie Mayo sent at 7/9/2025 10:51 AM EDT -----  Please call [facility] [address] for copy of PSG   Order for new machine placed

## 2025-07-10 NOTE — TELEPHONE ENCOUNTER
[facility], they will check their records and call me back if found     Faxed new PAP order and notes to [facility]

## 2025-08-05 ENCOUNTER — APPOINTMENT (OUTPATIENT)
Dept: PRIMARY CARE | Facility: CLINIC | Age: 55
End: 2025-08-05
Payer: MEDICARE

## 2025-08-12 ENCOUNTER — HOSPITAL ENCOUNTER (OUTPATIENT)
Dept: RADIOLOGY | Facility: CLINIC | Age: 55
Discharge: HOME | End: 2025-08-12
Payer: MEDICARE

## 2025-08-12 ENCOUNTER — OFFICE VISIT (OUTPATIENT)
Dept: PRIMARY CARE | Facility: CLINIC | Age: 55
End: 2025-08-12
Payer: MEDICARE

## 2025-08-12 VITALS
TEMPERATURE: 97.9 F | BODY MASS INDEX: 50.98 KG/M2 | HEIGHT: 61 IN | HEART RATE: 68 BPM | WEIGHT: 270 LBS | OXYGEN SATURATION: 99 % | RESPIRATION RATE: 18 BRPM | DIASTOLIC BLOOD PRESSURE: 82 MMHG | SYSTOLIC BLOOD PRESSURE: 122 MMHG

## 2025-08-12 DIAGNOSIS — G89.29 CHRONIC MIDLINE LOW BACK PAIN WITH SCIATICA, SCIATICA LATERALITY UNSPECIFIED: ICD-10-CM

## 2025-08-12 DIAGNOSIS — M54.40 CHRONIC MIDLINE LOW BACK PAIN WITH SCIATICA, SCIATICA LATERALITY UNSPECIFIED: ICD-10-CM

## 2025-08-12 DIAGNOSIS — Z23 ENCOUNTER FOR IMMUNIZATION: ICD-10-CM

## 2025-08-12 DIAGNOSIS — E53.8 VITAMIN B12 DEFICIENCY: Primary | ICD-10-CM

## 2025-08-12 PROCEDURE — 1036F TOBACCO NON-USER: CPT | Performed by: NURSE PRACTITIONER

## 2025-08-12 PROCEDURE — 2500000004 HC RX 250 GENERAL PHARMACY W/ HCPCS (ALT 636 FOR OP/ED): Performed by: NURSE PRACTITIONER

## 2025-08-12 PROCEDURE — 3008F BODY MASS INDEX DOCD: CPT | Performed by: NURSE PRACTITIONER

## 2025-08-12 PROCEDURE — 99213 OFFICE O/P EST LOW 20 MIN: CPT | Performed by: NURSE PRACTITIONER

## 2025-08-12 PROCEDURE — 72100 X-RAY EXAM L-S SPINE 2/3 VWS: CPT | Performed by: RADIOLOGY

## 2025-08-12 PROCEDURE — 96372 THER/PROPH/DIAG INJ SC/IM: CPT | Performed by: NURSE PRACTITIONER

## 2025-08-12 PROCEDURE — 72100 X-RAY EXAM L-S SPINE 2/3 VWS: CPT

## 2025-08-12 RX ORDER — CYCLOBENZAPRINE HCL 5 MG
5 TABLET ORAL NIGHTLY PRN
Qty: 30 TABLET | Refills: 2 | Status: SHIPPED | OUTPATIENT
Start: 2025-08-12 | End: 2026-04-09

## 2025-08-12 RX ORDER — DICLOFENAC SODIUM 10 MG/G
4 GEL TOPICAL 4 TIMES DAILY
Qty: 100 G | Refills: 1 | Status: SHIPPED | OUTPATIENT
Start: 2025-08-12

## 2025-08-12 RX ORDER — CYANOCOBALAMIN 1000 UG/ML
1000 INJECTION, SOLUTION INTRAMUSCULAR; SUBCUTANEOUS ONCE
Status: COMPLETED | OUTPATIENT
Start: 2025-08-12 | End: 2025-08-12

## 2025-08-12 RX ORDER — METHYLPREDNISOLONE 4 MG/1
TABLET ORAL
Qty: 21 TABLET | Refills: 0 | Status: SHIPPED | OUTPATIENT
Start: 2025-08-12 | End: 2025-08-18

## 2025-08-12 RX ORDER — AMMONIUM LACTATE 12 G/100G
CREAM TOPICAL
COMMUNITY
Start: 2025-07-22

## 2025-08-12 RX ADMIN — CYANOCOBALAMIN 1000 MCG: 1000 INJECTION, SOLUTION SUBCUTANEOUS at 12:39

## 2025-08-12 ASSESSMENT — PATIENT HEALTH QUESTIONNAIRE - PHQ9
SUM OF ALL RESPONSES TO PHQ9 QUESTIONS 1 AND 2: 0
2. FEELING DOWN, DEPRESSED OR HOPELESS: NOT AT ALL
1. LITTLE INTEREST OR PLEASURE IN DOING THINGS: NOT AT ALL

## 2025-08-12 ASSESSMENT — ENCOUNTER SYMPTOMS
ARTHRALGIAS: 1
BACK PAIN: 1

## 2025-08-12 ASSESSMENT — PAIN SCALES - GENERAL: PAINLEVEL_OUTOF10: 0-NO PAIN

## 2025-08-22 ENCOUNTER — HOSPITAL ENCOUNTER (EMERGENCY)
Facility: HOSPITAL | Age: 55
Discharge: HOME | End: 2025-08-22
Payer: MEDICARE

## 2025-08-22 ENCOUNTER — APPOINTMENT (OUTPATIENT)
Dept: RADIOLOGY | Facility: HOSPITAL | Age: 55
End: 2025-08-22
Payer: MEDICARE

## 2025-08-22 VITALS
BODY MASS INDEX: 50.98 KG/M2 | HEIGHT: 61 IN | OXYGEN SATURATION: 99 % | HEART RATE: 86 BPM | DIASTOLIC BLOOD PRESSURE: 80 MMHG | WEIGHT: 270 LBS | SYSTOLIC BLOOD PRESSURE: 135 MMHG | TEMPERATURE: 97 F | RESPIRATION RATE: 15 BRPM

## 2025-08-22 DIAGNOSIS — S40.012A CONTUSION OF LEFT SHOULDER, INITIAL ENCOUNTER: Primary | ICD-10-CM

## 2025-08-22 PROCEDURE — 73090 X-RAY EXAM OF FOREARM: CPT | Mod: LEFT SIDE | Performed by: STUDENT IN AN ORGANIZED HEALTH CARE EDUCATION/TRAINING PROGRAM

## 2025-08-22 PROCEDURE — 73110 X-RAY EXAM OF WRIST: CPT | Mod: LT

## 2025-08-22 PROCEDURE — 73564 X-RAY EXAM KNEE 4 OR MORE: CPT | Mod: LT

## 2025-08-22 PROCEDURE — 73110 X-RAY EXAM OF WRIST: CPT | Mod: LEFT SIDE | Performed by: STUDENT IN AN ORGANIZED HEALTH CARE EDUCATION/TRAINING PROGRAM

## 2025-08-22 PROCEDURE — 2500000001 HC RX 250 WO HCPCS SELF ADMINISTERED DRUGS (ALT 637 FOR MEDICARE OP): Performed by: PHYSICIAN ASSISTANT

## 2025-08-22 PROCEDURE — 99284 EMERGENCY DEPT VISIT MOD MDM: CPT

## 2025-08-22 PROCEDURE — 73090 X-RAY EXAM OF FOREARM: CPT | Mod: LT

## 2025-08-22 PROCEDURE — 73130 X-RAY EXAM OF HAND: CPT | Mod: LEFT SIDE | Performed by: STUDENT IN AN ORGANIZED HEALTH CARE EDUCATION/TRAINING PROGRAM

## 2025-08-22 PROCEDURE — 73564 X-RAY EXAM KNEE 4 OR MORE: CPT | Mod: LEFT SIDE | Performed by: STUDENT IN AN ORGANIZED HEALTH CARE EDUCATION/TRAINING PROGRAM

## 2025-08-22 PROCEDURE — 73130 X-RAY EXAM OF HAND: CPT | Mod: LT

## 2025-08-22 PROCEDURE — 73060 X-RAY EXAM OF HUMERUS: CPT | Mod: LEFT SIDE | Performed by: STUDENT IN AN ORGANIZED HEALTH CARE EDUCATION/TRAINING PROGRAM

## 2025-08-22 PROCEDURE — 73060 X-RAY EXAM OF HUMERUS: CPT | Mod: LT

## 2025-08-22 RX ORDER — ACETAMINOPHEN 325 MG/1
975 TABLET ORAL ONCE
Status: COMPLETED | OUTPATIENT
Start: 2025-08-22 | End: 2025-08-22

## 2025-08-22 RX ADMIN — ACETAMINOPHEN 975 MG: 325 TABLET ORAL at 21:45

## 2025-08-22 ASSESSMENT — PAIN DESCRIPTION - LOCATION: LOCATION: HAND

## 2025-08-22 ASSESSMENT — PAIN DESCRIPTION - ORIENTATION: ORIENTATION: LEFT

## 2025-08-22 ASSESSMENT — PAIN SCALES - GENERAL: PAINLEVEL_OUTOF10: 10 - WORST POSSIBLE PAIN

## 2025-08-22 ASSESSMENT — PAIN - FUNCTIONAL ASSESSMENT: PAIN_FUNCTIONAL_ASSESSMENT: 0-10

## 2025-09-09 ENCOUNTER — APPOINTMENT (OUTPATIENT)
Dept: PRIMARY CARE | Facility: CLINIC | Age: 55
End: 2025-09-09
Payer: MEDICARE

## 2025-09-10 ENCOUNTER — APPOINTMENT (OUTPATIENT)
Dept: PRIMARY CARE | Facility: CLINIC | Age: 55
End: 2025-09-10
Payer: MEDICARE

## 2025-09-19 ENCOUNTER — APPOINTMENT (OUTPATIENT)
Dept: ORTHOPEDIC SURGERY | Facility: CLINIC | Age: 55
End: 2025-09-19
Payer: MEDICARE

## 2025-10-21 ENCOUNTER — APPOINTMENT (OUTPATIENT)
Dept: PULMONOLOGY | Facility: CLINIC | Age: 55
End: 2025-10-21
Payer: MEDICARE

## 2025-12-30 ENCOUNTER — APPOINTMENT (OUTPATIENT)
Dept: PULMONOLOGY | Facility: CLINIC | Age: 55
End: 2025-12-30
Payer: MEDICARE

## 2026-01-06 ENCOUNTER — APPOINTMENT (OUTPATIENT)
Dept: PULMONOLOGY | Facility: CLINIC | Age: 56
End: 2026-01-06
Payer: MEDICARE

## 2026-01-28 ENCOUNTER — APPOINTMENT (OUTPATIENT)
Dept: SLEEP MEDICINE | Facility: CLINIC | Age: 56
End: 2026-01-28
Payer: MEDICARE